# Patient Record
Sex: FEMALE | Race: WHITE | Employment: UNEMPLOYED | URBAN - METROPOLITAN AREA
[De-identification: names, ages, dates, MRNs, and addresses within clinical notes are randomized per-mention and may not be internally consistent; named-entity substitution may affect disease eponyms.]

---

## 2021-08-11 ENCOUNTER — APPOINTMENT (OUTPATIENT)
Dept: CT IMAGING | Age: 46
End: 2021-08-11
Payer: COMMERCIAL

## 2021-08-11 ENCOUNTER — HOSPITAL ENCOUNTER (EMERGENCY)
Age: 46
Discharge: LEFT BEFORE DISCHARGE | End: 2021-08-12
Attending: EMERGENCY MEDICINE
Payer: COMMERCIAL

## 2021-08-11 DIAGNOSIS — R79.89 ELEVATED LFTS: ICD-10-CM

## 2021-08-11 DIAGNOSIS — R20.0 NUMBNESS AND TINGLING: Primary | ICD-10-CM

## 2021-08-11 DIAGNOSIS — R20.2 NUMBNESS AND TINGLING: Primary | ICD-10-CM

## 2021-08-11 DIAGNOSIS — R53.1 LEFT-SIDED WEAKNESS: ICD-10-CM

## 2021-08-11 LAB
A/G RATIO: 1.4 (ref 1.1–2.2)
ALBUMIN SERPL-MCNC: 5 G/DL (ref 3.4–5)
ALP BLD-CCNC: 151 U/L (ref 40–129)
ALT SERPL-CCNC: 166 U/L (ref 10–40)
ANION GAP SERPL CALCULATED.3IONS-SCNC: 21 MMOL/L (ref 3–16)
AST SERPL-CCNC: 216 U/L (ref 15–37)
BASOPHILS ABSOLUTE: 0 K/UL (ref 0–0.2)
BASOPHILS RELATIVE PERCENT: 1.3 %
BILIRUB SERPL-MCNC: 0.4 MG/DL (ref 0–1)
BUN BLDV-MCNC: 8 MG/DL (ref 7–20)
CALCIUM SERPL-MCNC: 9.9 MG/DL (ref 8.3–10.6)
CHLORIDE BLD-SCNC: 99 MMOL/L (ref 99–110)
CO2: 24 MMOL/L (ref 21–32)
CREAT SERPL-MCNC: 0.7 MG/DL (ref 0.6–1.1)
D DIMER: 201 NG/ML DDU (ref 0–229)
EOSINOPHILS ABSOLUTE: 0.1 K/UL (ref 0–0.6)
EOSINOPHILS RELATIVE PERCENT: 1.8 %
GFR AFRICAN AMERICAN: >60
GFR NON-AFRICAN AMERICAN: >60
GLOBULIN: 3.6 G/DL
GLUCOSE BLD-MCNC: 138 MG/DL (ref 70–99)
HCT VFR BLD CALC: 39.4 % (ref 36–48)
HEMOGLOBIN: 13.7 G/DL (ref 12–16)
LYMPHOCYTES ABSOLUTE: 1.3 K/UL (ref 1–5.1)
LYMPHOCYTES RELATIVE PERCENT: 35.8 %
MCH RBC QN AUTO: 35.2 PG (ref 26–34)
MCHC RBC AUTO-ENTMCNC: 34.8 G/DL (ref 31–36)
MCV RBC AUTO: 101.1 FL (ref 80–100)
MONOCYTES ABSOLUTE: 0.5 K/UL (ref 0–1.3)
MONOCYTES RELATIVE PERCENT: 12.8 %
NEUTROPHILS ABSOLUTE: 1.8 K/UL (ref 1.7–7.7)
NEUTROPHILS RELATIVE PERCENT: 48.3 %
PDW BLD-RTO: 13.4 % (ref 12.4–15.4)
PLATELET # BLD: 85 K/UL (ref 135–450)
PLATELET SLIDE REVIEW: ABNORMAL
PMV BLD AUTO: 9.2 FL (ref 5–10.5)
POTASSIUM SERPL-SCNC: 3.6 MMOL/L (ref 3.5–5.1)
PRO-BNP: 25 PG/ML (ref 0–124)
RBC # BLD: 3.9 M/UL (ref 4–5.2)
SLIDE REVIEW: ABNORMAL
SODIUM BLD-SCNC: 144 MMOL/L (ref 136–145)
TOTAL PROTEIN: 8.6 G/DL (ref 6.4–8.2)
TROPONIN: <0.01 NG/ML
WBC # BLD: 3.7 K/UL (ref 4–11)

## 2021-08-11 PROCEDURE — 80053 COMPREHEN METABOLIC PANEL: CPT

## 2021-08-11 PROCEDURE — 83880 ASSAY OF NATRIURETIC PEPTIDE: CPT

## 2021-08-11 PROCEDURE — 70450 CT HEAD/BRAIN W/O DYE: CPT

## 2021-08-11 PROCEDURE — 2580000003 HC RX 258: Performed by: PHYSICIAN ASSISTANT

## 2021-08-11 PROCEDURE — 99284 EMERGENCY DEPT VISIT MOD MDM: CPT

## 2021-08-11 PROCEDURE — 93005 ELECTROCARDIOGRAM TRACING: CPT | Performed by: PHYSICIAN ASSISTANT

## 2021-08-11 PROCEDURE — 85025 COMPLETE CBC W/AUTO DIFF WBC: CPT

## 2021-08-11 PROCEDURE — 85379 FIBRIN DEGRADATION QUANT: CPT

## 2021-08-11 PROCEDURE — 84484 ASSAY OF TROPONIN QUANT: CPT

## 2021-08-11 RX ORDER — 0.9 % SODIUM CHLORIDE 0.9 %
1000 INTRAVENOUS SOLUTION INTRAVENOUS ONCE
Status: COMPLETED | OUTPATIENT
Start: 2021-08-11 | End: 2021-08-11

## 2021-08-11 RX ADMIN — SODIUM CHLORIDE 1000 ML: 9 INJECTION, SOLUTION INTRAVENOUS at 22:40

## 2021-08-11 ASSESSMENT — ENCOUNTER SYMPTOMS
COUGH: 0
SHORTNESS OF BREATH: 1
VOMITING: 0
DIARRHEA: 0
COLOR CHANGE: 0

## 2021-08-12 ENCOUNTER — APPOINTMENT (OUTPATIENT)
Dept: CT IMAGING | Age: 46
End: 2021-08-12
Payer: COMMERCIAL

## 2021-08-12 VITALS
OXYGEN SATURATION: 96 % | BODY MASS INDEX: 24.75 KG/M2 | WEIGHT: 134.5 LBS | TEMPERATURE: 98.3 F | HEART RATE: 85 BPM | RESPIRATION RATE: 16 BRPM | SYSTOLIC BLOOD PRESSURE: 132 MMHG | DIASTOLIC BLOOD PRESSURE: 87 MMHG | HEIGHT: 62 IN

## 2021-08-12 LAB
EKG ATRIAL RATE: 102 BPM
EKG DIAGNOSIS: NORMAL
EKG P AXIS: 49 DEGREES
EKG P-R INTERVAL: 132 MS
EKG Q-T INTERVAL: 358 MS
EKG QRS DURATION: 80 MS
EKG QTC CALCULATION (BAZETT): 466 MS
EKG R AXIS: 21 DEGREES
EKG T AXIS: 14 DEGREES
EKG VENTRICULAR RATE: 102 BPM

## 2021-08-12 PROCEDURE — 93010 ELECTROCARDIOGRAM REPORT: CPT | Performed by: INTERNAL MEDICINE

## 2021-08-12 PROCEDURE — 6360000004 HC RX CONTRAST MEDICATION: Performed by: PHYSICIAN ASSISTANT

## 2021-08-12 PROCEDURE — 70498 CT ANGIOGRAPHY NECK: CPT

## 2021-08-12 RX ADMIN — IOPAMIDOL 75 ML: 755 INJECTION, SOLUTION INTRAVENOUS at 00:40

## 2021-08-12 NOTE — ED PROVIDER NOTES
[facility]      Pt Name: Dontrell Bowman  MRN: [id number]  [place] 1975  Date of evaluation: 8/11/2021  Provider: LUZMARIA Granado    This patient was seen and evaluated by the attending physician Benoit Samaniego MD.    [address] [place] [address]       Chief Complaint   Patient presents with    Other     had first covid shot on monday not feeling well since        CRITICAL CARE TIME   I performed a total Critical Care time of 35 minutes, excluding separately reportable procedures. There was a high probability of clinically significant/life threatening deterioration in the patient's condition which required my urgent intervention. Not limited to multiple reexaminations, discussions with attending physician and consultants. HISTORY OF PRESENT ILLNESS  (Location/Symptom, Timing/Onset, Context/Setting, Quality, Duration, Modifying Factors, Severity.)   Dontrell Bowman is a 55 y.o. female who presents to the emergency department via EMS from home with complaint of shortness of breath. She states that on Monday she received her first of the [employer] vaccinations and on a couple of hours after this developed a numb tingling sensation on the outside of her left arm and leg. She states it feels weak and that she has pain when she tries to put weight on the left leg and arm. She states a couple of hours ago she started feeling short of breath. No productive cough or hemoptysis. She states that she has some nausea no vomiting. She denies known chronic medical problems although she states she is seeing a GI doctor for abdominal bloating in the coming weeks. She states that she drinks alcohol occasionally but not regularly or heavily. She has a bruise on her right eye that she states happened after she slipped on a new rug in her bathroom a couple of days ago. Nursing Notes were reviewed and I agree.     REVIEW OF SYSTEMS    (2-9 systems for level 4, 10 or more for level 5)     Review of Systems   Constitutional: Negative for fever. Respiratory: Positive for shortness of breath. Negative for cough. Cardiovascular: Positive for chest pain. Gastrointestinal: Negative for diarrhea and vomiting. Musculoskeletal: Positive for arthralgias. Skin: Negative for color change and rash. Neurological: Positive for weakness and numbness. Psychiatric/Behavioral: Negative for agitation, behavioral problems and confusion. Except as noted above the remainder of the review of systems was reviewed and negative. PAST MEDICAL HISTORY   No past medical history on file. SURGICAL HISTORY     No past surgical history on file. CURRENT MEDICATIONS       Previous Medications    No medications on file       ALLERGIES     Patient has no known allergies. FAMILY HISTORY     No family history on file. No family status information on file. SOCIAL HISTORY          PHYSICAL EXAM    (up to 7 for level 4, 8 or more for level 5)     ED Triage Vitals [08/11/21 2119]   BP Temp Temp src Pulse Resp SpO2 Height Weight   (!) 132/100 -- -- 114 16 96 % 5' 2\" (1.575 m) 134 lb 8 oz (61 kg)       Physical Exam  Vitals and nursing note reviewed. Constitutional:       Appearance: Normal appearance. HENT:      Head: Normocephalic and atraumatic. Mouth/Throat:      Mouth: Mucous membranes are moist.   Eyes:      Pupils: Pupils are equal, round, and reactive to light. Cardiovascular:      Rate and Rhythm: Tachycardia present. Pulses: Normal pulses. Pulmonary:      Effort: Pulmonary effort is normal. No respiratory distress. Abdominal:      Tenderness: There is no abdominal tenderness. There is no guarding or rebound. Musculoskeletal:         General: No swelling or deformity. Cervical back: Normal range of motion. Skin:     General: Skin is warm. Neurological:      General: No focal deficit present.       Mental Status: She is alert and oriented to person, place, and time. Cranial Nerves: No cranial nerve deficit. Sensory: Sensory deficit present. Motor: Weakness present. Gait: Gait normal.      Deep Tendon Reflexes: Reflexes normal.   Psychiatric:         Mood and Affect: Mood normal.         Behavior: Behavior normal.         DIAGNOSTIC RESULTS     EKG: All EKG's are interpreted by LUZMARIA Holliday in the absence of a cardiologist.    EKG interpreted by myself - please refer to attending physician's note for complete EKG interpretation:    Rhythm: sinus rhythm   Rate: tachycardia  No evidence of acute ischemia or injury. RADIOLOGY:   Non-plain film images such as CT, Ultrasound and MRI are read by the radiologist. Plain radiographic images are visualized and preliminarily interpreted by LUZMARIA Holliday with the below findings:    Reviewed radiologist's interpretation. Interpretation per the Radiologist below, if available at the time of this note:    CTA HEAD NECK W CONTRAST   Final Result   No large vessel occlusion or hemodynamic stenosis         CT HEAD WO CONTRAST   Final Result   No acute intracranial abnormality.                LABS:  Labs Reviewed   CBC WITH AUTO DIFFERENTIAL - Abnormal; Notable for the following components:       Result Value    WBC 3.7 (*)     RBC 3.90 (*)     .1 (*)     MCH 35.2 (*)     Platelets 85 (*)     All other components within normal limits    Narrative:     Performed at:  [facility]  [address] [facility] [address]   Phone [phone]   COMPREHENSIVE METABOLIC PANEL - Abnormal; Notable for the following components:    Anion Gap 21 (*)     Glucose 138 (*)     Total Protein 8.6 (*)     Alkaline Phosphatase 151 (*)      (*)      (*)     All other components within normal limits    Narrative:     Performed at:  [facility]  [address] [place], [place] [address]   Phone [phone]   COVID-19, RAPID   TROPONIN    Narrative:     Performed at:  [facility]  [address] [place] [address]   Phone [phone]   BRAIN NATRIURETIC PEPTIDE    Narrative:     Performed at:  [facility]  [address] [facility] [address]   Phone [phone]   D-DIMER, QUANTITATIVE    Narrative:     Performed at:  [facility]  [address] [place] [address]   Phone [phone]       All other labs were within normal range or not returned as of this dictation. EMERGENCY DEPARTMENT COURSE and DIFFERENTIAL DIAGNOSIS/MDM:   Vitals:    Vitals:    08/11/21 2119 08/11/21 2303   BP: (!) 132/100    Pulse: 114    Resp: 16    Temp:  98.3 °F (36.8 °C)   TempSrc:  Oral   SpO2: 96%    Weight: 134 lb 8 oz (61 kg)    Height: 5' 2\" (1.575 m)      Patient presents tachycardic febrile blood pressure initially elevated 132/100. She is not hypoxic she had episode of shortness of breath which prompted her to come to the ER however she said 3 days of numbness and weakness in her left arm and leg. Her pulses are intact at this time she seems to have good strength bilateral upper and lower extremities. Numb tingling sensation specifically in the lateral leg and arm has persisted for several days. I discussed several times with the patient and her significant other that our concern is for stroke. Her D-dimer is not elevated. CT scan is reassuring. The patient continues to desire to leave [place] and follow-up with her primary care doctor. She is alert and oriented x3. I discussed the findings of elevated liver enzymes and that this could be causing her bloating in her abdomen and pain and that she should have alcohol cessation. Return at anytime if she changes her mind or for new, worsening or other concerns.     CONSULTS:  None    PROCEDURES:  Procedures      FINAL IMPRESSION      1. Numbness and tingling    2. Left-sided weakness    3.  Elevated LFTs          DISPOSITION/PLAN   DISPOSITION [place] 08/12/2021 01:28:52 AM      PATIENT REFERRED TO:  Bi Zaman  [address]  [phone]    Call in 1 day  For follow up      [address] Kellee Gonzalez:  New Prescriptions    No medications on file       (Please note that portions of this note were completed with a voice recognition program.  Efforts were made to edit the dictations but occasionally words are mis-transcribed.)    Vaishali Griffith, [address] [place], [place]  08/12/21 9352

## 2021-08-12 NOTE — ED PROVIDER NOTES
EKG Interpretation    Interpreted by emergency department physician  Time performed: 5960  Time read: 2227    Rhythm: Sinus tachycardia  Ventricular Rate: 102  QRS Axis: 21  Ectopy: None  Conduction: Sinus tachycardia  ST Segments: normal  T Waves: normal  Q Waves: None    Other findings: Motion artifact but EKG is readable    Compared to EKG on: None to compare    Clinical Impression: Normal sinus rhythm, normal EKG, there is motion artifact but EKG is readable. There is no old EKG to compare.     Tran 149, [address], [place]  08/11/21 5752

## 2021-08-12 NOTE — ED PROVIDER NOTES
I independently evaluated and obtained a history and physical on [facility]. All diagnostic, treatment, and disposition assistants were made to myself in conjunction the advanced practice provider. For further details of this patient's emergency department encounter, please see the advanced practice provider's documentation. History: 59-year-old female presents complaining of left arm and leg numbness present for the past 2 days. Patient states the symptoms started after she received her first COVID-19 vaccine on Monday. Patient denies any actual weakness of her arm or leg but states that the numbness has persisted throughout the past 2 days. Patient also states she been feeling very fatigued. Denies any cough. States she has had some shortness of breath. No fevers. No nausea or vomiting. Normal urinary and bowel habits. Physician Exam: Alert and oriented x4, EOMI, PERRLA, normocephalic and atraumatic, moist mucous membranes, tachycardic, regular rhythm, lungs clear auscultation bilaterally, speaking in full sentences, abdomen soft nontender nondistended, 5/5 strength, decreased light touch sensation left upper and lower extremity, normal light touch sensation right upper and lower extremity, GCS 15, cranial nerves II through XII intact, normal gait, no dysarthria, no aphasia, no facial droop, skin warm and dry    NIH Stroke Scale  NIH Stroke Scale Assessed: Yes  Interval: Baseline  Level of Consciousness (1a. ): Alert  LOC Questions (1b. ):  Answers both correctly  LOC Commands (1c. ): Performs both tasks correctly  Best Gaze (2. ): Normal  Visual (3. ): No visual loss  Facial Palsy (4. ): Normal symmetrical movement  Motor Arm, Left (5a. ): No drift  Motor Arm, Right (5b. ): No drift  Motor Leg, Left (6a. ): No drift  Motor Leg, Right (6b. ): No drift  Limb Ataxia (7. ): Absent  Sensory (8. ): (!) Mild to Moderate  Best Language (9. ): No aphasia  Dysarthria (10. ): Normal  Extinction and Inattention (11): No abnormality  Total: 1      The Ekg interpreted by me shows  Sinus tachycardia with a rate of 102, normal axis, , QRS 80, QTc 466, no ST elevations, nonspecific ST changes, no prior EKG on file. Patient seen and evaluated. History and physical as above. Nontoxic and afebrile. Patient presents with left arm and leg paresthesias x2 days. Patient outside the treatment for any acute stroke. Possible slight left upper extremity weakness compared to the right although weakness is not profound or debilitating. CT head unremarkable. Plan for admission for further neurologic evaluation and MRI.     Jayda Gonzalez MD   [employer]  8/11/21  10:05 PM EDT            Jayda Gonzalez MD  08/11/21 5471

## 2021-08-12 NOTE — ED NOTES
Bed: C28  Expected date:   Expected time:   Means of arrival: [facility] EMS  Comments:  46 SOB s/p covid shot 2 days ago     Kisha Domínguez RN  08/11/21 2104

## 2021-08-13 ENCOUNTER — CARE COORDINATION (OUTPATIENT)
Dept: CARE COORDINATION | Age: 46
End: 2021-08-13

## 2021-08-13 NOTE — CARE COORDINATION
Educated patient about risk for severe COVID-19 due to risk factors according to CDC guidelines. ACM reviewed discharge instructions, medical action plan and red flag symptoms with the patient who verbalized understanding. Discussed COVID vaccination status: Yes. Education provided on COVID-19 vaccination as appropriate. Discussed exposure protocols and quarantine with CDC Guidelines. Patient was given an opportunity to verbalize any questions and concerns and agrees to contact ACM or health care provider for questions related to their healthcare. Pt returned call to [facility] today. She reports she has been very \"lethargic\" since she received the first vaccine dose this past Monday. She stated she was sleeping when ACM called earlier today and has been sleeping a lot since Monday. She stated she has not been eating much, but she is trying to stay well hydrated by drinking water. Pt reports she did not receive the COVID-19 test while at the hospital and ACM informed her I see the test had been cancelled, but I'm not sure why. She stated she is also breathing easier today, but did not have an overall good experience in the ED. ACM listened as pt described her visit, and [facility] provided pt with the contact information to reach a patient representative. [facility] asked if pt had called to f/u with her PCP yet and she stated the PCP listed does not accept her insurance. [facility] provided pt with the find a doc information and also described how she can call the customer service number on her insurance card and ask for a list of providers accepting new patients. Pt verbalized understanding. ACM reviewed s/s of when to return to the ED including: any chest pain/pressure, any increase in difficulty breathing or SOB, or any fainting episodes. Pt verbalized understanding.      ACM and patient discussed some of her lab values, and ACM informed pt this is another reason to f/u with a PCP so that they can discuss, in detail, her lab results. Pt stated she would do so. ACM asked if patient has questions/concerns and she stated she does not. ACM encouraged her to call ACM if any arise. She agreed with this plan.

## 2021-08-13 NOTE — CARE COORDINATION
Outreach attempt regarding pt recent visit to the ED. Pt was unable to reach today; ACM left VM message with contact information. ACM to make another attempt at a later date/time.

## 2021-08-27 ENCOUNTER — CARE COORDINATION (OUTPATIENT)
Dept: CARE COORDINATION | Age: 46
End: 2021-08-27

## 2022-06-05 ENCOUNTER — HOSPITAL ENCOUNTER (INPATIENT)
Age: 47
LOS: 2 days | Discharge: LEFT BEFORE DISCHARGE | DRG: 053 | End: 2022-06-07
Attending: EMERGENCY MEDICINE | Admitting: INTERNAL MEDICINE
Payer: COMMERCIAL

## 2022-06-05 ENCOUNTER — APPOINTMENT (OUTPATIENT)
Dept: CT IMAGING | Age: 47
DRG: 053 | End: 2022-06-05
Payer: COMMERCIAL

## 2022-06-05 DIAGNOSIS — D69.6 THROMBOCYTOPENIA (HCC): ICD-10-CM

## 2022-06-05 DIAGNOSIS — R56.9 NEW ONSET SEIZURE (HCC): Primary | ICD-10-CM

## 2022-06-05 DIAGNOSIS — F10.11 HISTORY OF ALCOHOL ABUSE: ICD-10-CM

## 2022-06-05 DIAGNOSIS — R79.89 ELEVATED LFTS: ICD-10-CM

## 2022-06-05 DIAGNOSIS — N30.00 ACUTE CYSTITIS WITHOUT HEMATURIA: ICD-10-CM

## 2022-06-05 DIAGNOSIS — E87.20 METABOLIC ACIDOSIS: ICD-10-CM

## 2022-06-05 LAB
ACETAMINOPHEN LEVEL: <5 UG/ML (ref 10–30)
ALBUMIN SERPL-MCNC: 4.8 G/DL (ref 3.4–5)
ALP BLD-CCNC: 140 U/L (ref 40–129)
ALT SERPL-CCNC: 123 U/L (ref 10–40)
AMMONIA: 28 UMOL/L (ref 11–51)
AMPHETAMINE SCREEN, URINE: NORMAL
ANION GAP SERPL CALCULATED.3IONS-SCNC: 25 MMOL/L (ref 3–16)
ANION GAP SERPL CALCULATED.3IONS-SCNC: 30 MMOL/L (ref 3–16)
AST SERPL-CCNC: 152 U/L (ref 15–37)
BACTERIA: ABNORMAL /HPF
BARBITURATE SCREEN URINE: NORMAL
BASOPHILS ABSOLUTE: 0 K/UL (ref 0–0.2)
BASOPHILS RELATIVE PERCENT: 0.3 %
BENZODIAZEPINE SCREEN, URINE: NORMAL
BILIRUB SERPL-MCNC: 2 MG/DL (ref 0–1)
BILIRUBIN DIRECT: 0.7 MG/DL (ref 0–0.3)
BILIRUBIN URINE: NEGATIVE
BILIRUBIN, INDIRECT: 1.3 MG/DL (ref 0–1)
BLOOD, URINE: ABNORMAL
BUN BLDV-MCNC: 5 MG/DL (ref 7–20)
BUN BLDV-MCNC: 9 MG/DL (ref 7–20)
CALCIUM SERPL-MCNC: 7.6 MG/DL (ref 8.3–10.6)
CALCIUM SERPL-MCNC: 9.4 MG/DL (ref 8.3–10.6)
CANNABINOID SCREEN URINE: NORMAL
CHLORIDE BLD-SCNC: 89 MMOL/L (ref 99–110)
CHLORIDE BLD-SCNC: 96 MMOL/L (ref 99–110)
CLARITY: ABNORMAL
CO2: 14 MMOL/L (ref 21–32)
CO2: 14 MMOL/L (ref 21–32)
COCAINE METABOLITE SCREEN URINE: NORMAL
COLOR: YELLOW
COMMENT UA: ABNORMAL
CREAT SERPL-MCNC: 0.6 MG/DL (ref 0.6–1.1)
CREAT SERPL-MCNC: <0.5 MG/DL (ref 0.6–1.1)
EOSINOPHILS ABSOLUTE: 0 K/UL (ref 0–0.6)
EOSINOPHILS RELATIVE PERCENT: 0.1 %
EPITHELIAL CELLS, UA: 15 /HPF (ref 0–5)
ETHANOL: NORMAL MG/DL (ref 0–0.08)
GFR AFRICAN AMERICAN: >60
GFR AFRICAN AMERICAN: >60
GFR NON-AFRICAN AMERICAN: >60
GFR NON-AFRICAN AMERICAN: >60
GLUCOSE BLD-MCNC: 107 MG/DL (ref 70–99)
GLUCOSE BLD-MCNC: 190 MG/DL (ref 70–99)
GLUCOSE BLD-MCNC: 193 MG/DL (ref 70–99)
GLUCOSE URINE: NEGATIVE MG/DL
HAV IGM SER IA-ACNC: NORMAL
HCT VFR BLD CALC: 38.6 % (ref 36–48)
HEMOGLOBIN: 13.3 G/DL (ref 12–16)
HEPATITIS B CORE IGM ANTIBODY: NORMAL
HEPATITIS B SURFACE ANTIGEN INTERPRETATION: NORMAL
HEPATITIS C ANTIBODY INTERPRETATION: NORMAL
HYALINE CASTS: 0 /LPF (ref 0–8)
INR BLD: 1.19 (ref 0.87–1.14)
KETONES, URINE: >=160 MG/DL
LACTIC ACID: 1 MMOL/L (ref 0.4–2)
LACTIC ACID: 3.7 MMOL/L (ref 0.4–2)
LEUKOCYTE ESTERASE, URINE: ABNORMAL
LIPASE: 54 U/L (ref 13–60)
LYMPHOCYTES ABSOLUTE: 0.4 K/UL (ref 1–5.1)
LYMPHOCYTES RELATIVE PERCENT: 10.9 %
Lab: NORMAL
MAGNESIUM: 1.5 MG/DL (ref 1.8–2.4)
MCH RBC QN AUTO: 36.6 PG (ref 26–34)
MCHC RBC AUTO-ENTMCNC: 34.5 G/DL (ref 31–36)
MCV RBC AUTO: 106 FL (ref 80–100)
METHADONE SCREEN, URINE: NORMAL
MICROSCOPIC EXAMINATION: YES
MONOCYTES ABSOLUTE: 0.3 K/UL (ref 0–1.3)
MONOCYTES RELATIVE PERCENT: 8.5 %
NEUTROPHILS ABSOLUTE: 3.2 K/UL (ref 1.7–7.7)
NEUTROPHILS RELATIVE PERCENT: 80.2 %
NITRITE, URINE: NEGATIVE
OPIATE SCREEN URINE: NORMAL
OXYCODONE URINE: NORMAL
PDW BLD-RTO: 14.1 % (ref 12.4–15.4)
PERFORMED ON: ABNORMAL
PH UA: 5.5 (ref 5–8)
PH UA: 6
PHENCYCLIDINE SCREEN URINE: NORMAL
PLATELET # BLD: 60 K/UL (ref 135–450)
PMV BLD AUTO: 8.4 FL (ref 5–10.5)
POTASSIUM REFLEX MAGNESIUM: 3.6 MMOL/L (ref 3.5–5.1)
POTASSIUM REFLEX MAGNESIUM: 3.7 MMOL/L (ref 3.5–5.1)
PROPOXYPHENE SCREEN: NORMAL
PROTEIN UA: 300 MG/DL
PROTHROMBIN TIME: 15 SEC (ref 11.7–14.5)
RAPID INFLUENZA  B AGN: NEGATIVE
RAPID INFLUENZA A AGN: NEGATIVE
RBC # BLD: 3.64 M/UL (ref 4–5.2)
RBC UA: ABNORMAL /HPF (ref 0–4)
SALICYLATE, SERUM: <0.3 MG/DL (ref 15–30)
SARS-COV-2, NAAT: NOT DETECTED
SODIUM BLD-SCNC: 133 MMOL/L (ref 136–145)
SODIUM BLD-SCNC: 135 MMOL/L (ref 136–145)
SPECIFIC GRAVITY UA: 1.02 (ref 1–1.03)
TOTAL CK: 379 U/L (ref 26–192)
TOTAL PROTEIN: 8.3 G/DL (ref 6.4–8.2)
TROPONIN: <0.01 NG/ML
URINE REFLEX TO CULTURE: YES
URINE TYPE: ABNORMAL
UROBILINOGEN, URINE: 0.2 E.U./DL
WBC # BLD: 4 K/UL (ref 4–11)
WBC UA: 79 /HPF (ref 0–5)

## 2022-06-05 PROCEDURE — 83735 ASSAY OF MAGNESIUM: CPT

## 2022-06-05 PROCEDURE — 80143 DRUG ASSAY ACETAMINOPHEN: CPT

## 2022-06-05 PROCEDURE — 83605 ASSAY OF LACTIC ACID: CPT

## 2022-06-05 PROCEDURE — 36415 COLL VENOUS BLD VENIPUNCTURE: CPT

## 2022-06-05 PROCEDURE — 96365 THER/PROPH/DIAG IV INF INIT: CPT

## 2022-06-05 PROCEDURE — 85025 COMPLETE CBC W/AUTO DIFF WBC: CPT

## 2022-06-05 PROCEDURE — 87086 URINE CULTURE/COLONY COUNT: CPT

## 2022-06-05 PROCEDURE — 81001 URINALYSIS AUTO W/SCOPE: CPT

## 2022-06-05 PROCEDURE — 80307 DRUG TEST PRSMV CHEM ANLYZR: CPT

## 2022-06-05 PROCEDURE — 87804 INFLUENZA ASSAY W/OPTIC: CPT

## 2022-06-05 PROCEDURE — 6370000000 HC RX 637 (ALT 250 FOR IP): Performed by: INTERNAL MEDICINE

## 2022-06-05 PROCEDURE — 99285 EMERGENCY DEPT VISIT HI MDM: CPT

## 2022-06-05 PROCEDURE — 87040 BLOOD CULTURE FOR BACTERIA: CPT

## 2022-06-05 PROCEDURE — 70450 CT HEAD/BRAIN W/O DYE: CPT

## 2022-06-05 PROCEDURE — 85610 PROTHROMBIN TIME: CPT

## 2022-06-05 PROCEDURE — 6360000002 HC RX W HCPCS: Performed by: NURSE PRACTITIONER

## 2022-06-05 PROCEDURE — 6360000002 HC RX W HCPCS: Performed by: INTERNAL MEDICINE

## 2022-06-05 PROCEDURE — 6370000000 HC RX 637 (ALT 250 FOR IP): Performed by: NURSE PRACTITIONER

## 2022-06-05 PROCEDURE — 96366 THER/PROPH/DIAG IV INF ADDON: CPT

## 2022-06-05 PROCEDURE — 2580000003 HC RX 258: Performed by: NURSE PRACTITIONER

## 2022-06-05 PROCEDURE — 6360000004 HC RX CONTRAST MEDICATION: Performed by: NURSE PRACTITIONER

## 2022-06-05 PROCEDURE — 80074 ACUTE HEPATITIS PANEL: CPT

## 2022-06-05 PROCEDURE — 2500000003 HC RX 250 WO HCPCS: Performed by: NURSE PRACTITIONER

## 2022-06-05 PROCEDURE — 96375 TX/PRO/DX INJ NEW DRUG ADDON: CPT

## 2022-06-05 PROCEDURE — 96361 HYDRATE IV INFUSION ADD-ON: CPT

## 2022-06-05 PROCEDURE — 96367 TX/PROPH/DG ADDL SEQ IV INF: CPT

## 2022-06-05 PROCEDURE — 1200000000 HC SEMI PRIVATE

## 2022-06-05 PROCEDURE — 84484 ASSAY OF TROPONIN QUANT: CPT

## 2022-06-05 PROCEDURE — 71260 CT THORAX DX C+: CPT

## 2022-06-05 PROCEDURE — 82550 ASSAY OF CK (CPK): CPT

## 2022-06-05 PROCEDURE — 87635 SARS-COV-2 COVID-19 AMP PRB: CPT

## 2022-06-05 PROCEDURE — 83690 ASSAY OF LIPASE: CPT

## 2022-06-05 PROCEDURE — 82077 ASSAY SPEC XCP UR&BREATH IA: CPT

## 2022-06-05 PROCEDURE — 80048 BASIC METABOLIC PNL TOTAL CA: CPT

## 2022-06-05 PROCEDURE — 82140 ASSAY OF AMMONIA: CPT

## 2022-06-05 PROCEDURE — 2580000003 HC RX 258: Performed by: INTERNAL MEDICINE

## 2022-06-05 PROCEDURE — 80076 HEPATIC FUNCTION PANEL: CPT

## 2022-06-05 PROCEDURE — 80179 DRUG ASSAY SALICYLATE: CPT

## 2022-06-05 PROCEDURE — 93005 ELECTROCARDIOGRAM TRACING: CPT | Performed by: NURSE PRACTITIONER

## 2022-06-05 PROCEDURE — 74177 CT ABD & PELVIS W/CONTRAST: CPT

## 2022-06-05 RX ORDER — LORAZEPAM 2 MG/ML
3 INJECTION INTRAMUSCULAR
Status: DISCONTINUED | OUTPATIENT
Start: 2022-06-05 | End: 2022-06-07 | Stop reason: HOSPADM

## 2022-06-05 RX ORDER — POLYETHYLENE GLYCOL 3350 17 G/17G
17 POWDER, FOR SOLUTION ORAL DAILY PRN
Status: DISCONTINUED | OUTPATIENT
Start: 2022-06-05 | End: 2022-06-07 | Stop reason: HOSPADM

## 2022-06-05 RX ORDER — LORAZEPAM 1 MG/1
3 TABLET ORAL
Status: DISCONTINUED | OUTPATIENT
Start: 2022-06-05 | End: 2022-06-07 | Stop reason: HOSPADM

## 2022-06-05 RX ORDER — SODIUM CHLORIDE 0.9 % (FLUSH) 0.9 %
5-40 SYRINGE (ML) INJECTION PRN
Status: DISCONTINUED | OUTPATIENT
Start: 2022-06-05 | End: 2022-06-07 | Stop reason: HOSPADM

## 2022-06-05 RX ORDER — ACETAMINOPHEN 325 MG/1
650 TABLET ORAL EVERY 6 HOURS PRN
Status: DISCONTINUED | OUTPATIENT
Start: 2022-06-05 | End: 2022-06-07 | Stop reason: HOSPADM

## 2022-06-05 RX ORDER — SODIUM CHLORIDE 0.9 % (FLUSH) 0.9 %
5-40 SYRINGE (ML) INJECTION PRN
Status: DISCONTINUED | OUTPATIENT
Start: 2022-06-05 | End: 2022-06-05 | Stop reason: SDUPTHER

## 2022-06-05 RX ORDER — SODIUM CHLORIDE 0.9 % (FLUSH) 0.9 %
5-40 SYRINGE (ML) INJECTION EVERY 12 HOURS SCHEDULED
Status: DISCONTINUED | OUTPATIENT
Start: 2022-06-05 | End: 2022-06-07 | Stop reason: HOSPADM

## 2022-06-05 RX ORDER — MAGNESIUM SULFATE IN WATER 40 MG/ML
4000 INJECTION, SOLUTION INTRAVENOUS ONCE
Status: COMPLETED | OUTPATIENT
Start: 2022-06-05 | End: 2022-06-05

## 2022-06-05 RX ORDER — ONDANSETRON 2 MG/ML
4 INJECTION INTRAMUSCULAR; INTRAVENOUS EVERY 6 HOURS PRN
Status: DISCONTINUED | OUTPATIENT
Start: 2022-06-05 | End: 2022-06-07 | Stop reason: HOSPADM

## 2022-06-05 RX ORDER — 0.9 % SODIUM CHLORIDE 0.9 %
1000 INTRAVENOUS SOLUTION INTRAVENOUS ONCE
Status: COMPLETED | OUTPATIENT
Start: 2022-06-05 | End: 2022-06-05

## 2022-06-05 RX ORDER — LORAZEPAM 2 MG/ML
1 INJECTION INTRAMUSCULAR
Status: DISCONTINUED | OUTPATIENT
Start: 2022-06-05 | End: 2022-06-07 | Stop reason: HOSPADM

## 2022-06-05 RX ORDER — ACETAMINOPHEN 500 MG
1000 TABLET ORAL ONCE
Status: COMPLETED | OUTPATIENT
Start: 2022-06-05 | End: 2022-06-05

## 2022-06-05 RX ORDER — LORAZEPAM 0.5 MG/1
0.5 TABLET ORAL ONCE
Status: COMPLETED | OUTPATIENT
Start: 2022-06-05 | End: 2022-06-05

## 2022-06-05 RX ORDER — SODIUM CHLORIDE 0.9 % (FLUSH) 0.9 %
5-40 SYRINGE (ML) INJECTION EVERY 12 HOURS SCHEDULED
Status: DISCONTINUED | OUTPATIENT
Start: 2022-06-05 | End: 2022-06-05 | Stop reason: SDUPTHER

## 2022-06-05 RX ORDER — ACETAMINOPHEN/DIPHENHYDRAMINE 500MG-25MG
1 TABLET ORAL NIGHTLY PRN
COMMUNITY

## 2022-06-05 RX ORDER — LORAZEPAM 1 MG/1
2 TABLET ORAL
Status: DISCONTINUED | OUTPATIENT
Start: 2022-06-05 | End: 2022-06-07 | Stop reason: HOSPADM

## 2022-06-05 RX ORDER — LORAZEPAM 1 MG/1
1 TABLET ORAL
Status: DISCONTINUED | OUTPATIENT
Start: 2022-06-05 | End: 2022-06-07 | Stop reason: HOSPADM

## 2022-06-05 RX ORDER — M-VIT,TX,IRON,MINS/CALC/FOLIC 27MG-0.4MG
1 TABLET ORAL DAILY
COMMUNITY

## 2022-06-05 RX ORDER — LORAZEPAM 2 MG/ML
2 INJECTION INTRAMUSCULAR
Status: DISCONTINUED | OUTPATIENT
Start: 2022-06-05 | End: 2022-06-07 | Stop reason: HOSPADM

## 2022-06-05 RX ORDER — LORAZEPAM 1 MG/1
4 TABLET ORAL
Status: DISCONTINUED | OUTPATIENT
Start: 2022-06-05 | End: 2022-06-07 | Stop reason: HOSPADM

## 2022-06-05 RX ORDER — ACETAMINOPHEN 650 MG/1
650 SUPPOSITORY RECTAL EVERY 6 HOURS PRN
Status: DISCONTINUED | OUTPATIENT
Start: 2022-06-05 | End: 2022-06-07 | Stop reason: HOSPADM

## 2022-06-05 RX ORDER — SODIUM CHLORIDE 9 MG/ML
INJECTION, SOLUTION INTRAVENOUS PRN
Status: DISCONTINUED | OUTPATIENT
Start: 2022-06-05 | End: 2022-06-05 | Stop reason: SDUPTHER

## 2022-06-05 RX ORDER — LEVETIRACETAM 500 MG/1
500 TABLET ORAL 2 TIMES DAILY
Status: DISCONTINUED | OUTPATIENT
Start: 2022-06-05 | End: 2022-06-07 | Stop reason: HOSPADM

## 2022-06-05 RX ORDER — MULTIVITAMIN WITH IRON
1 TABLET ORAL DAILY
Status: DISCONTINUED | OUTPATIENT
Start: 2022-06-05 | End: 2022-06-07 | Stop reason: HOSPADM

## 2022-06-05 RX ORDER — SODIUM CHLORIDE 9 MG/ML
INJECTION, SOLUTION INTRAVENOUS PRN
Status: DISCONTINUED | OUTPATIENT
Start: 2022-06-05 | End: 2022-06-07 | Stop reason: HOSPADM

## 2022-06-05 RX ORDER — DROPERIDOL 2.5 MG/ML
0.62 INJECTION, SOLUTION INTRAMUSCULAR; INTRAVENOUS EVERY 6 HOURS PRN
Status: DISCONTINUED | OUTPATIENT
Start: 2022-06-05 | End: 2022-06-07 | Stop reason: HOSPADM

## 2022-06-05 RX ORDER — LORAZEPAM 2 MG/ML
4 INJECTION INTRAMUSCULAR
Status: DISCONTINUED | OUTPATIENT
Start: 2022-06-05 | End: 2022-06-07 | Stop reason: HOSPADM

## 2022-06-05 RX ORDER — ONDANSETRON 4 MG/1
4 TABLET, ORALLY DISINTEGRATING ORAL EVERY 8 HOURS PRN
Status: DISCONTINUED | OUTPATIENT
Start: 2022-06-05 | End: 2022-06-07 | Stop reason: HOSPADM

## 2022-06-05 RX ADMIN — LORAZEPAM 2 MG: 2 INJECTION INTRAMUSCULAR; INTRAVENOUS at 21:42

## 2022-06-05 RX ADMIN — LORAZEPAM 2 MG: 2 INJECTION INTRAMUSCULAR; INTRAVENOUS at 16:12

## 2022-06-05 RX ADMIN — ACETAMINOPHEN 650 MG: 325 TABLET ORAL at 16:11

## 2022-06-05 RX ADMIN — IOPAMIDOL 75 ML: 755 INJECTION, SOLUTION INTRAVENOUS at 11:01

## 2022-06-05 RX ADMIN — CEFTRIAXONE 1000 MG: 1 INJECTION, POWDER, FOR SOLUTION INTRAMUSCULAR; INTRAVENOUS at 11:40

## 2022-06-05 RX ADMIN — MAGNESIUM SULFATE HEPTAHYDRATE 4000 MG: 40 INJECTION, SOLUTION INTRAVENOUS at 15:33

## 2022-06-05 RX ADMIN — LORAZEPAM 0.5 MG: 0.5 TABLET ORAL at 10:06

## 2022-06-05 RX ADMIN — LEVETIRACETAM 1500 MG: 100 INJECTION, SOLUTION INTRAVENOUS at 10:05

## 2022-06-05 RX ADMIN — SODIUM CHLORIDE 1000 ML: 9 INJECTION, SOLUTION INTRAVENOUS at 10:04

## 2022-06-05 RX ADMIN — ACETAMINOPHEN 1000 MG: 500 TABLET ORAL at 10:06

## 2022-06-05 RX ADMIN — DROPERIDOL 0.62 MG: 2.5 INJECTION, SOLUTION INTRAMUSCULAR; INTRAVENOUS at 10:02

## 2022-06-05 RX ADMIN — FOLIC ACID: 5 INJECTION, SOLUTION INTRAMUSCULAR; INTRAVENOUS; SUBCUTANEOUS at 11:37

## 2022-06-05 RX ADMIN — LEVETIRACETAM 500 MG: 500 TABLET, FILM COATED ORAL at 15:31

## 2022-06-05 RX ADMIN — LEVETIRACETAM 500 MG: 500 TABLET, FILM COATED ORAL at 21:42

## 2022-06-05 RX ADMIN — SODIUM CHLORIDE, PRESERVATIVE FREE 10 ML: 5 INJECTION INTRAVENOUS at 21:42

## 2022-06-05 RX ADMIN — SODIUM CHLORIDE 1000 ML: 9 INJECTION, SOLUTION INTRAVENOUS at 11:43

## 2022-06-05 RX ADMIN — THERA TABS 1 TABLET: TAB at 16:11

## 2022-06-05 ASSESSMENT — PAIN SCALES - GENERAL
PAINLEVEL_OUTOF10: 3
PAINLEVEL_OUTOF10: 7
PAINLEVEL_OUTOF10: 3
PAINLEVEL_OUTOF10: 6
PAINLEVEL_OUTOF10: 3
PAINLEVEL_OUTOF10: 0

## 2022-06-05 ASSESSMENT — PAIN DESCRIPTION - ONSET: ONSET: ON-GOING

## 2022-06-05 ASSESSMENT — PAIN DESCRIPTION - LOCATION: LOCATION: HEAD

## 2022-06-05 ASSESSMENT — PAIN DESCRIPTION - ORIENTATION: ORIENTATION: RIGHT;LEFT

## 2022-06-05 ASSESSMENT — PAIN DESCRIPTION - DESCRIPTORS: DESCRIPTORS: ACHING

## 2022-06-05 ASSESSMENT — PAIN - FUNCTIONAL ASSESSMENT: PAIN_FUNCTIONAL_ASSESSMENT: ACTIVITIES ARE NOT PREVENTED

## 2022-06-05 ASSESSMENT — PAIN DESCRIPTION - FREQUENCY: FREQUENCY: CONTINUOUS

## 2022-06-05 ASSESSMENT — PAIN DESCRIPTION - PAIN TYPE: TYPE: ACUTE PAIN

## 2022-06-05 NOTE — ED NOTES
Pt resting in bed comfortably with no s/s or c/o pain or distress noted or reported. No further seizure activity noted or reported. Seizure pads/precautions remain in place. Significant other remains at bedside. Call light in reach, will continue to monitor.       Makenna Castillo RN  06/05/22 7093

## 2022-06-05 NOTE — PROGRESS NOTES
Patient is now in room 4121, VSS on room air, patient is lethargic but arousal to voice and touch. When awake patient A+Ox3, disorientated to place.  at bedside, heart monitor in place, seizure and fall precautions are also in place. Orientation to the room provided and all questions answered at this time. Will continue to monitor.      Electronically signed by Abdelrahman Rushing RN on 6/5/2022 at 6:01 PM

## 2022-06-05 NOTE — ED NOTES
Pt to ED via [facility] d/t seizures. Per EMS report, pt had 2 seizures this morning, witnessed by her , both lasting approx 30-40 seconds. First seizure was around 0500 this morning and the second one was around 0830. Pt has had 1 seizure in the past, states she was not prescribed seizure meds. Pt did bite her lip, dried blood noted to bottom lip. Pt is alert and oriented x4 upon ED arrival, placed on cardiac monitor and IV being placed. Seizure pads/precautions put in place.   [facility] at bedside upon pt arrival.      Debbi Luna RN  06/05/22 9097

## 2022-06-05 NOTE — ED PROVIDER NOTES
[facility]      Pt Name: Evan Mireles  MRN: [id number]  [place] 1975  Date of evaluation: 6/5/2022  Provider: Rosana Jaquez, [address] [place] [address]  Chief Complaint   Patient presents with    Seizures     Pt to ED via [place] [address] EMS d/t seizures. Per EMS report, pt had 2 seizures this morning, witnessed by her , both lasting approx 30-40 seconds. Pt has had 1 seizure in the past, was not rx seizure meds. I have fully participated in the care of Evan Mireles and have had a face-to-face evaluation. I have reviewed and agree with all pertinent clinical information, and midlevel provider's history, and physical exam. I have also reviewed the labs, EKG, and imaging studies and treatment plan. I have also reviewed and agree with the medications, allergies and past medical history section for this Evan Mireles. I agree with the diagnosis, and I concur. I wore personal protective equipment when I was in the room the entire time. This includes gloves, N95 mask, face shield, and a glove over my stethoscope for protection. Past Medical History:   Diagnosis Date    Seizures ([facility]        MDM:  Evan Mireles is a 55 y.o. female who presents with seizure activity per . She had 2 seizures this morning. She has had 1 seizure in the past that was related to alcohol withdrawal.  She was seen at [facility] at that time. She has not drank alcohol for a long time according to her . Patient is confused and postictal.  When I evaluated her she was tired but alert and oriented x3. Physical exam is nonfocal.  Heart is regular rate and rhythm without murmurs clicks or rubs. She is mildly tachycardic. Temperature is 99.7 and blood pressure soft at 107/75. She was given IV fluids in the emergency department. She has a metabolic acidosis with a CO2 of 14.   Lactic acid is elevated, liver functions are elevated. Platelets are 01,811. MCV is elevated. These are all consistent with chronic alcoholism. It may be related to either medical problems. Therefore, patient was admitted to the hospital for further evaluation and treatment. Her temperature elevated to 100.5 during her stay with a heart rate of 101. Therefore, she is considered SIRS activity with a source of UTI. Hospitalist was notified by the nurse practitioner. Vitals:    06/05/22 1237   BP: 107/75   Pulse: (!) 102   Resp: 18   Temp:    SpO2: 97%       Lab results  Labs Reviewed   CK - Abnormal; Notable for the following components:       Result Value    Total  (*)     All other components within normal limits    Narrative:     Annika Hung tel. [phone],  Chemistry results called to and read back by Debbi Luna Rn, 06/05/2022 10:51,  by Jose David   LACTIC ACID - Abnormal; Notable for the following components:    Lactic Acid 3.7 (*)     All other components within normal limits   CBC WITH AUTO DIFFERENTIAL - Abnormal; Notable for the following components:    RBC 3.64 (*)     .0 (*)     MCH 36.6 (*)     Platelets 60 (*)     Lymphocytes Absolute 0.4 (*)     All other components within normal limits   BASIC METABOLIC PANEL W/ REFLEX TO MG FOR LOW K - Abnormal; Notable for the following components:    Sodium 133 (*)     Chloride 89 (*)     CO2 14 (*)     Anion Gap 30 (*)     Glucose 190 (*)     All other components within normal limits    Narrative:     Annika Hung tel. [phone],  Chemistry results called to and read back by Debbi Luna Rn, 06/05/2022 10:51,  by Jose David   HEPATIC FUNCTION PANEL - Abnormal; Notable for the following components: Total Protein 8.3 (*)     Alkaline Phosphatase 140 (*)      (*)      (*)     Total Bilirubin 2.0 (*)     Bilirubin, Direct 0.7 (*)     Bilirubin, Indirect 1.3 (*)     All other components within normal limits    Narrative:     CALL  [place]  SKERK tel. [phone],  Chemistry results called to and read back by Johanna Ramirez Rn, 06/05/2022 10:51,  by Fracisco Perales TO CULTURE - Abnormal; Notable for the following components:    Clarity, UA CLOUDY (*)     Ketones, Urine >=160 (*)     Blood, Urine LARGE (*)     Leukocyte Esterase, Urine SMALL (*)     All other components within normal limits   ACETAMINOPHEN LEVEL - Abnormal; Notable for the following components:    Acetaminophen Level <5 (*)     All other components within normal limits    Narrative:     [place] E[place] tel. [phone],  Chemistry results called to and read back by Johanna Ramirez Rn, 06/05/2022 94:07,  by Cyndie Abts   SALICYLATE LEVEL - Abnormal; Notable for the following components:    Salicylate, Serum <0.6 (*)     All other components within normal limits    Narrative:     [place]a tel. [phone],  Chemistry results called to and read back by Johanna Ramirez Rn, 06/05/2022 10:51,  by Cyndie Abts   MICROSCOPIC URINALYSIS - Abnormal; Notable for the following components:    Bacteria, UA 1+ (*)     WBC, UA 79 (*)     Epithelial Cells, UA 15 (*)     All other components within normal limits   PROTIME-INR - Abnormal; Notable for the following components:    Protime 15.0 (*)     INR 1.19 (*)     All other components within normal limits   POCT GLUCOSE - Abnormal; Notable for the following components:    POC Glucose 193 (*)     All other components within normal limits   RAPID INFLUENZA A/B ANTIGENS   COVID-19, RAPID   CULTURE, URINE   LIPASE    Narrative:     CALL  [place]  Beka Donnelly,  Chemistry results called to and read back by Maryam Perry, 06/05/2022 10:51,  by Cyndie Abts   TROPONIN   ETHANOL    Narrative:     CALL  [place]  Bkea Donnelly,  Chemistry results called to and read back by Johanna Ramirez Rn, 06/05/2022 10:51,  by [address] [place] [address],[address] ACID   POCT GLUCOSE       EKG Results  EKG Interpretation    Interpreted by emergency department physician  Time performed: 1021  Time read: 1024    Rhythm: Sinus  Ventricular Rate: 105  QRS Axis: 22  Ectopy: None  Conduction: Normal sinus rhythm with severe motion artifact making EKG unreadable  ST Segments: Unable to ascertain  T Waves: Unable to ascertain  Q Waves: Unable to determine    Other findings: None    Compared to EKG on: 8/11/2021    Clinical Impression: Sinus tachycardia with severe motion artifact making EKG unreadable. It appears unchanged from her previous EKG on 8/11/2021. Tran 149, DO    Radiology results  CT Head WO Contrast   Final Result   No acute intracranial abnormality. CT CHEST PULMONARY EMBOLISM W CONTRAST   Final Result   1. Negative for pulmonary embolus. 2. Fatty infiltration of the liver. CT ABDOMEN PELVIS W IV CONTRAST Additional Contrast? None   Final Result   1. Negative for pulmonary embolus. 2. Fatty infiltration of the liver. Medications   droperidol (INAPSINE) injection 0.625 mg (0.625 mg IntraVENous Given 6/5/22 1002)   0.9 % sodium chloride bolus (0 mLs IntraVENous Stopped 6/5/22 1105)   levETIRAcetam (KEPPRA) 1,500 mg in sodium chloride 0.9 % 100 mL IVPB (0 mg IntraVENous Stopped 6/5/22 1031)   LORazepam (ATIVAN) tablet 0.5 mg (0.5 mg Oral Given 6/5/22 1006)   acetaminophen (TYLENOL) tablet 1,000 mg (1,000 mg Oral Given 6/5/22 1006)   sodium chloride 0.9 % 5,322 mL with folic acid 1 mg, adult multi-vitamin with vitamin k 10 mL, thiamine 100 mg ( IntraVENous New Bag 6/5/22 1137)   cefTRIAXone (ROCEPHIN) 1000 mg IVPB in 50 mL D5W minibag (0 mg IntraVENous Stopped 6/5/22 1248)   iopamidol (ISOVUE-370) 76 % injection 75 mL (75 mLs IntraVENous Given 6/5/22 1101)   0.9 % sodium chloride bolus (0 mLs IntraVENous Stopped 6/5/22 1248)       New Prescriptions    No medications on file       The patient's blood pressure was not found to be elevated according to CMS/Medicare and the Affordable Care Act/ObamaCare criteria.    IMPRESSIONS:  1. [facility]    2. History of alcohol abuse    3. Metabolic acidosis    4. Thrombocytopenia (HCC)    5. Elevated LFTs    6.  Acute cystitis without hematuria              Juli Davies, DO  06/05/22 [address] [facility], DO  06/05/22 130

## 2022-06-05 NOTE — ED NOTES
Pt with episode of emesis. SANTIAGO Kumar made aware, new orders placed.       Kenneth Duarte RN  06/05/22 7246

## 2022-06-05 NOTE — ED PROVIDER NOTES
[address]  [address] [address]  Dept: [phone]  Loc: [address] [place] [address]        This patient was seen and evaluated by myself in conjunction with the ED attending Dr. Micky Zapata    Chief Complaint   Patient presents with    Seizures     Pt to ED via [facility] d/t seizures. Per EMS report, pt had 2 seizures this morning, witnessed by her , both lasting approx 30-40 seconds. Pt has had 1 seizure in the past, was not rx seizure meds. HPI    Quinton Last is a 55 y.o. female who presents with a seizure that occurred shortly prior to arrival.  The quality of the seizure was supposedly convulsions witnessed by the , once about 5:30 AM and mother about 8:30 AM.  She was lying in bed when this happened. Patient does not remember this happening at all, she is alert and oriented currently. Supposedly according to EMS the seizures lasted about 30 to 45 seconds. She does have a history of seizures, she had one last year but was not placed on any AEDs. Her only complaint right now is that her lip hurts, where she bit it. Denies any recent cough, fevers or chills. Denies any abdominal pain, chest pain, shortness of breath, nausea vomiting or diarrhea recently. She has a history of a hysterectomy, is not pregnant. Patient was brought in via EMS for further evaluation and treatment. Family is not at bedside yet. REVIEW OF SYSTEMS    Neuro: see HPI  Cardiac: No chest pain or palpitations  Respiratory: No shortness of breath or new cough  General: No fevers or chills  : No dysuria or hematuria  GI: No vomiting or diarrhea  Musculoskeletal: Patient denies any shoulder pain or limited range of motion  See HPI for further details. All other systems reviewed and are negative.     PAST MEDICAL OR SURGICAL HISTORY    Past Medical History:   Diagnosis Date    Seizures ([facility] [address]      History reviewed. No pertinent surgical history. CURRENT MEDICATIONS  (may include discharge medications prescribed in the ED)      ALLERGIES    No Known Allergies    FAMILY OR SOCIAL HISTORY    History reviewed. No pertinent family history. Social History     Socioeconomic History    Marital status: Unknown     Spouse name: None    Number of children: None    Years of education: None    Highest education level: None   Occupational History    None   Tobacco Use    Smoking status: Never Smoker    Smokeless tobacco: Never Used   Vaping Use    Vaping Use: Never used   Substance and Sexual Activity    Alcohol use: Not Currently    Drug use: Not Currently    Sexual activity: Yes     Partners: Male   Other Topics Concern    None   Social History Narrative    None     Social Determinants of Health     Financial Resource Strain:     Difficulty of Paying Living Expenses: Not on file   Food Insecurity:     Worried About Running Out of Food in the Last Year: Not on file    Noe of Food in the Last Year: Not on file   Transportation Needs:     Lack of Transportation (Medical): Not on file    Lack of Transportation (Non-Medical):  Not on file   Physical Activity:     Days of Exercise per Week: Not on file    Minutes of Exercise per Session: Not on file   Stress:     Feeling of Stress : Not on file   Social Connections:     Frequency of Communication with Friends and Family: Not on file    Frequency of Social Gatherings with Friends and Family: Not on file    Attends Hindu Services: Not on file    Active Member of Clubs or Organizations: Not on file    Attends Club or Organization Meetings: Not on file    Marital Status: Not on file   Intimate Partner Violence:     Fear of Current or Ex-Partner: Not on file    Emotionally Abused: Not on file    Physically Abused: Not on file    Sexually Abused: Not on file   Housing Stability:     Unable to Pay for Housing in the Last Year: Not on file    Number of Places Lived in the Last Year: Not on file    Unstable Housing in the Last Year: Not on file       PHYSICAL EXAM    VITAL SIGNS: /75   Pulse (!) 102   Temp 99.7 °F (37.6 °C) (Oral)   Resp 18   Ht 5' 2\" (1.575 m)   Wt 136 lb (61.7 kg)   SpO2 97%   BMI 24.87 kg/m²   Constitutional:  Well developed, well nourished, no acute distress  Eyes:  Pupils equally round and reactive to light, sclera nonicteric  HENT:  External ears normal, nose normal, oropharynx moist, tongue normal.  She does have a couple of abrasions to her lower lip with some associated lip swelling where she states that she must bit it. NECK: Normal range of motion, no tenderness  Respiratory: Lungs clear to auscultation bilaterally, no respiratory distress, no wheezing   Cardiovascular:  regular rate, regular rhythm, no murmurs   GI:  Soft, nondistended, normal bowel sounds, nontender  Musculoskeletal:  no edema, no acute deformities   Integument:  Skin is warm and dry, no obvious rash   Neurologic:  Alert, oriented x4, no aphasia, no slurred speech, CN II-XII intact, normal finger to nose test bilaterally, 5/5 strength in all 4 extremities, sensation to light touch intact bilaterally  Vascular: Radial and DP pulses 2+ and equal bilaterally  Psych: Pleasant affect, no hallucinations        EKG    Please see the physician's note for EKG interpretation. RADIOLOGY  CT Head WO Contrast   Final Result   No acute intracranial abnormality. CT CHEST PULMONARY EMBOLISM W CONTRAST   Final Result   1. Negative for pulmonary embolus. 2. Fatty infiltration of the liver. CT ABDOMEN PELVIS W IV CONTRAST Additional Contrast? None   Final Result   1. Negative for pulmonary embolus. 2. Fatty infiltration of the liver.              LABS  Results for orders placed or performed during the hospital encounter of 06/05/22   Rapid influenza A/B antigens    Specimen: Nasopharyngeal   Result Value Ref Range Rapid Influenza A Ag Negative Negative    Rapid Influenza B Ag Negative Negative   COVID-19, Rapid    Specimen: Nasopharyngeal Swab   Result Value Ref Range    SARS-CoV-2, NAAT Not Detected Not Detected   CK   Result Value Ref Range    Total  (H) 26 - 192 U/L   Lactic Acid   Result Value Ref Range    Lactic Acid 3.7 (H) 0.4 - 2.0 mmol/L   CBC with Auto Differential   Result Value Ref Range    WBC 4.0 4.0 - 11.0 K/uL    RBC 3.64 (L) 4.00 - 5.20 M/uL    Hemoglobin 13.3 12.0 - 16.0 g/dL    Hematocrit 38.6 36.0 - 48.0 %    .0 (H) 80.0 - 100.0 fL    MCH 36.6 (H) 26.0 - 34.0 pg    MCHC 34.5 31.0 - 36.0 g/dL    RDW 14.1 12.4 - 15.4 %    Platelets 60 (L) 769 - 450 K/uL    MPV 8.4 5.0 - 10.5 fL    Neutrophils % 80.2 %    Lymphocytes % 10.9 %    Monocytes % 8.5 %    Eosinophils % 0.1 %    Basophils % 0.3 %    Neutrophils Absolute 3.2 1.7 - 7.7 K/uL    Lymphocytes Absolute 0.4 (L) 1.0 - 5.1 K/uL    Monocytes Absolute 0.3 0.0 - 1.3 K/uL    Eosinophils Absolute 0.0 0.0 - 0.6 K/uL    Basophils Absolute 0.0 0.0 - 0.2 K/uL   Basic Metabolic Panel w/ Reflex to MG   Result Value Ref Range    Sodium 133 (L) 136 - 145 mmol/L    Potassium reflex Magnesium 3.7 3.5 - 5.1 mmol/L    Chloride 89 (L) 99 - 110 mmol/L    CO2 14 (LL) 21 - 32 mmol/L    Anion Gap 30 (H) 3 - 16    Glucose 190 (H) 70 - 99 mg/dL    BUN 9 7 - 20 mg/dL    CREATININE 0.6 0.6 - 1.1 mg/dL    GFR Non-African American >60 >60    GFR African American >60 >60    Calcium 9.4 8.3 - 10.6 mg/dL   Hepatic Function Panel   Result Value Ref Range    Total Protein 8.3 (H) 6.4 - 8.2 g/dL    Albumin 4.8 3.4 - 5.0 g/dL    Alkaline Phosphatase 140 (H) 40 - 129 U/L     (H) 10 - 40 U/L     (H) 15 - 37 U/L    Total Bilirubin 2.0 (H) 0.0 - 1.0 mg/dL    Bilirubin, Direct 0.7 (H) 0.0 - 0.3 mg/dL    Bilirubin, Indirect 1.3 (H) 0.0 - 1.0 mg/dL   Lipase   Result Value Ref Range    Lipase 54.0 13.0 - 60.0 U/L   Troponin   Result Value Ref Range    Troponin <0.01 <0.01 ng/mL   Urinalysis with Reflex to Culture    Specimen: Urine   Result Value Ref Range    Color, UA Yellow Straw/Yellow    Clarity, UA CLOUDY (A) Clear    Glucose, Ur Negative Negative mg/dL    Bilirubin Urine Negative Negative    Ketones, Urine >=160 (A) Negative mg/dL    Specific Gravity, UA 1.023 1.005 - 1.030    Blood, Urine LARGE (A) Negative    pH, UA 5.5 5.0 - 8.0    Protein,  Negative mg/dL    Urobilinogen, Urine 0.2 <2.0 E.U./dL    Nitrite, Urine Negative Negative    Leukocyte Esterase, Urine SMALL (A) Negative    Microscopic Examination YES     Urine Type NotGiven     Urine Reflex to Culture Yes    Ethanol   Result Value Ref Range    Ethanol Lvl None Detected mg/dL   Urine Drug Screen   Result Value Ref Range    Amphetamine Screen, Urine Neg Negative <1000ng/mL    Barbiturate Screen, Ur Neg Negative <200 ng/mL    Benzodiazepine Screen, Urine Neg Negative <200 ng/mL    Cannabinoid Scrn, Ur Neg Negative <50 ng/mL    Cocaine Metabolite Screen, Urine Neg Negative <300 ng/mL    Opiate Scrn, Ur Neg Negative <300 ng/mL    PCP Screen, Urine Neg Negative <25 ng/mL    Methadone Screen, Urine Neg Negative <300 ng/mL    Propoxyphene Scrn, Ur Neg Negative <300 ng/mL    Oxycodone Urine Neg Negative <100 ng/ml    Drug Screen Comment: see below    Acetaminophen Level   Result Value Ref Range    Acetaminophen Level <5 (L) 10 - 30 ug/mL   Salicylate   Result Value Ref Range    Salicylate, Serum <1.9 (L) 15.0 - 30.0 mg/dL   Microscopic Urinalysis   Result Value Ref Range    RBC, UA 3-4 0 - 4 /HPF    Bacteria, UA 1+ (A) None Seen /HPF    Urinalysis Comments see below     Hyaline Casts, UA 0 0 - 8 /LPF    WBC, UA 79 (H) 0 - 5 /HPF    Epithelial Cells, UA 15 (H) 0 - 5 /HPF   Ammonia   Result Value Ref Range    Ammonia 28 11 - 51 umol/L   Protime-INR   Result Value Ref Range    Protime 15.0 (H) 11.7 - 14.5 sec    INR 1.19 (H) 0.87 - 1.14   POCT Glucose   Result Value Ref Range    POC Glucose 193 (H) 70 - 99 mg/dl Performed on [facility]            ED [facility]    Pertinent Labs & Imaging studies reviewed and interpreted. (See chart for details)  See chart for details of medications given during the ED stay. Differential diagnosis: status epilepticus, breakthrough seizure, intracranial bleed, brain tumor, hypoglycemia, neck fracture or other orthopedic fractures, posterior shoulder dislocation, tongue laceration, other    CRITICAL CARE NOTE:  There was a high probability of clinically significant life-threatening deterioration of the patient's condition requiring my urgent intervention. Total critical care time was at least 30 minutes. This includes vital sign monitoring, pulse oximetry monitoring, telemetry monitoring, clinical response to the IV medications, reviewing the nursing notes, consultation time, dictation/documentation time, and interpretation of the labwork. This excludes any separately billable procedures performed. The critical care time above also includes time spent obtaining history from electronic chart as pt was an unreliable historian AND the history obtained was directly relevant to the care of the patient. Patient is afebrile and nontoxic in appearance. She technically is afebrile but she does have a low-grade temp of 100.5 °F orally. Is normotensive. Heart rate is somewhat elevated at 101 but just borderline. She is not hypoxic or in any acute respiratory distress. She does have an elevated CK and lactic, does have a metabolic acidosis with a CO2 of 14. Likely secondary to the seizure. Point-of-care glucose was 193. She does have a slight coagulopathy with thrombocytopenia, INR is 1.19 and a platelet count of 60. This is decreased from her previous blood work. She has no petechiae or petechial rash noted. No leukocytosis or anemia.   Urine does have 1+ bacteria was 79 WBCs, started her on Rocephin send urine culture for culturing and sensitivities. Urine drug screen is unremarkable. Troponin negative. Acetaminophen and salicylate levels negative. Rapid COVID and flu negative. CTs as read by the radiologist as above    Patient has a worsening hepatic function panel despite her saying that she does not drink anymore since last years DT seizure. However her blood work/hepatic panel continues to trend into a worse direction consistent with more of a chronic alcoholic, is likely not being forthcoming that she is continued to drink since last year. Her alk phos is 140, , , total bili 2 with a bili direct of 0.7 and bili indirect of 1.3. Lipase unremarkable. She had no ethanol in her system, her CIWA is high though. She was given Ativan here. Given her metabolic acidosis, likely seizure secondary to alcohol withdrawal/abuse concern I do believe she needs to be admitted for further evaluation and treatment. Therefore consult to the hospitalist who agreed to admit patient and write orders for admission    FINAL IMPRESSION    1. New onset seizure ([address]    2. History of alcohol abuse    3. Metabolic acidosis    4. Thrombocytopenia (HCC)    5. Elevated LFTs    6.  Acute cystitis without hematuria        PLAN  Admission     (Please note that this note was completed with a voice recognition program.  Every attempt was made to edit the dictations, but inevitably there remain words that are mis-transcribed.)          KIYA Barry - CNP  06/05/22 5707

## 2022-06-05 NOTE — H&P
Department of Internal Medicine  General Internal Medicine   History and Physical      CHIEF COMPLAINT:  seizures     HISTORY OF PRESENT ILLNESS:    55 y.o. female with remote h/o seizures (1.5 years ago) and etoh (vodka) abuse presented after having couple episodes of seizures at home. Her seizure episodes were witnessed by the , once about 5:30 AM and another about 8:30 AM. She was lying in bed when this happened. Patient has no recollection of these episodes. Supposedly according to EMS, the seizures lasted about 30 to 45 seconds. She does have a history of seizures about year and a half ado when she was binge drinking vodka. She had been sober up until few months ago. She had some vodka few weeks ago and a beer last night. Not on any AED at home. She is alert and oriented currently. Denies any recent cough, fevers or chills. Denies any abdominal pain, chest pain, shortness of breath, nausea vomiting or diarrhea recently. Wants to eat. Denies known h/o liver disease.       Past Medical History:        Diagnosis Date    Seizures [facility])      Past Surgical History:    History reviewed. No pertinent surgical history. Medications Prior to Admission:    No current facility-administered medications on file prior to encounter. No current outpatient medications on file prior to encounter. Allergies:  Patient has no known allergies. Social History:           Socioeconomic History    Marital status: Unknown       Spouse name: None    Number of children: None    Years of education: None    Highest education level: None   Occupational History    None   Tobacco Use    Smoking status: Never Smoker    Smokeless tobacco: Never Used   Vaping Use    Vaping Use: Never used   Substance and Sexual Activity    Alcohol use: Not Currently    Drug use: Not Currently    Sexual activity: Yes       Partners: Male         Family History:   History reviewed. No pertinent family history.   REVIEW OF SYSTEMS:  Positives and pertinent negatives as per HPI. PHYSICAL EXAM:    Vitals:  /75   Pulse (!) 102   Temp 99.7 °F (37.6 °C) (Oral)   Resp 18   Ht 5' 2\" (1.575 m)   Wt 136 lb (61.7 kg)   SpO2 97%   BMI 24.87 kg/m²     Constitutional:  Well developed, well nourished, no acute distress  Eyes:  Pupils equally round and reactive to light, sclera nonicteric  HENT:  External ears normal, nose normal, oropharynx moist, tongue normal.  She does have a couple of abrasions to her lower lip with some associated lip swelling where she states that she must bit it. NECK: Normal range of motion, no tenderness  Respiratory: Lungs clear to auscultation bilaterally, no respiratory distress, no wheezing   Cardiovascular:  regular rate, regular rhythm, no murmurs   GI:  Soft, nondistended, normal bowel sounds, nontender  Musculoskeletal:  no edema, no acute deformities   Integument:  Skin is warm and dry, no obvious rash   Neurologic:  Alert, oriented x4, no aphasia, no slurred speech, CN II-XII intact, normal finger to nose test bilaterally, 5/5 strength in all 4 extremities, sensation to light touch intact bilaterally  Vascular: Radial and DP pulses 2+ and equal bilaterally  Psych: Pleasant affect, no hallucinations       DATA:     Radiology results  CT Head WO Contrast   Final Result   No acute intracranial abnormality.           CT CHEST PULMONARY EMBOLISM W CONTRAST   Final Result   1. Negative for pulmonary embolus. 2. Fatty infiltration of the liver.           CT ABDOMEN PELVIS W IV CONTRAST Additional Contrast? None   Final Result   1. Negative for pulmonary embolus. 2. Fatty infiltration of the liver.           ASSESSMENT AND PLAN:      New onset seizure  - Possibly secondary to alcohol withdrawal vs. convulsive disorder   - Urine drug screen is unremarkable.    - Acetaminophen and salicylate levels negative  - Neurology consulted  - Loading dose of keppra given in ED  - Cont PO keppra for now   - May

## 2022-06-05 NOTE — PROGRESS NOTES
Pharmacy Medication Reconciliation Note     List of medications Evan Mireles is currently taking is complete. Source of information:   1. Conversation with patient at bedside  2. EMR    Notes regarding home medications:   1.  Patient reports only MVI and APAP PM on a regular basis--not maintenance meds     Patient denies taking any OTC or herbal medications other than those listed     Pily Blackwood, Pharmacy Intern  6/5/2022  2:46 PM

## 2022-06-05 NOTE — ED NOTES
ED SBAR report provider to [facility]. Patient to be transported to Room 4121 via stretcher by transport tech. Patient transported with bedside cardiac monitor and with IV medications infusing. IV site clean, dry, and intact. MEWS score and pain assessed and documented. Updated patient and family on plan of care.      Rivas Conner RN  06/05/22 [id number]

## 2022-06-06 ENCOUNTER — APPOINTMENT (OUTPATIENT)
Dept: MRI IMAGING | Age: 47
DRG: 053 | End: 2022-06-06
Payer: COMMERCIAL

## 2022-06-06 LAB
A/G RATIO: 1.6 (ref 1.1–2.2)
ALBUMIN SERPL-MCNC: 4.7 G/DL (ref 3.4–5)
ALP BLD-CCNC: 116 U/L (ref 40–129)
ALT SERPL-CCNC: 98 U/L (ref 10–40)
ANION GAP SERPL CALCULATED.3IONS-SCNC: 26 MMOL/L (ref 3–16)
AST SERPL-CCNC: 124 U/L (ref 15–37)
BASOPHILS ABSOLUTE: 0 K/UL (ref 0–0.2)
BASOPHILS RELATIVE PERCENT: 0.3 %
BILIRUB SERPL-MCNC: 1.2 MG/DL (ref 0–1)
BUN BLDV-MCNC: 3 MG/DL (ref 7–20)
CALCIUM SERPL-MCNC: 7.8 MG/DL (ref 8.3–10.6)
CHLORIDE BLD-SCNC: 93 MMOL/L (ref 99–110)
CO2: 15 MMOL/L (ref 21–32)
CREAT SERPL-MCNC: <0.5 MG/DL (ref 0.6–1.1)
EKG ATRIAL RATE: 105 BPM
EKG DIAGNOSIS: NORMAL
EKG P AXIS: 40 DEGREES
EKG P-R INTERVAL: 122 MS
EKG Q-T INTERVAL: 360 MS
EKG QRS DURATION: 72 MS
EKG QTC CALCULATION (BAZETT): 475 MS
EKG R AXIS: 22 DEGREES
EKG T AXIS: 26 DEGREES
EKG VENTRICULAR RATE: 105 BPM
EOSINOPHILS ABSOLUTE: 0.1 K/UL (ref 0–0.6)
EOSINOPHILS RELATIVE PERCENT: 1.3 %
GFR AFRICAN AMERICAN: >60
GFR NON-AFRICAN AMERICAN: >60
GLUCOSE BLD-MCNC: 81 MG/DL (ref 70–99)
HCT VFR BLD CALC: 34 % (ref 36–48)
HEMOGLOBIN: 11.9 G/DL (ref 12–16)
LACTIC ACID: 0.7 MMOL/L (ref 0.4–2)
LYMPHOCYTES ABSOLUTE: 0.5 K/UL (ref 1–5.1)
LYMPHOCYTES RELATIVE PERCENT: 13.1 %
MAGNESIUM: 1.8 MG/DL (ref 1.8–2.4)
MCH RBC QN AUTO: 36.9 PG (ref 26–34)
MCHC RBC AUTO-ENTMCNC: 35.1 G/DL (ref 31–36)
MCV RBC AUTO: 105.1 FL (ref 80–100)
MONOCYTES ABSOLUTE: 0.4 K/UL (ref 0–1.3)
MONOCYTES RELATIVE PERCENT: 11.5 %
NEUTROPHILS ABSOLUTE: 2.8 K/UL (ref 1.7–7.7)
NEUTROPHILS RELATIVE PERCENT: 73.8 %
PDW BLD-RTO: 14.6 % (ref 12.4–15.4)
PLATELET # BLD: 51 K/UL (ref 135–450)
PMV BLD AUTO: 8.5 FL (ref 5–10.5)
POTASSIUM REFLEX MAGNESIUM: 3.4 MMOL/L (ref 3.5–5.1)
RBC # BLD: 3.23 M/UL (ref 4–5.2)
SODIUM BLD-SCNC: 134 MMOL/L (ref 136–145)
TOTAL PROTEIN: 7.7 G/DL (ref 6.4–8.2)
URINE CULTURE, ROUTINE: NORMAL
WBC # BLD: 3.8 K/UL (ref 4–11)

## 2022-06-06 PROCEDURE — 70553 MRI BRAIN STEM W/O & W/DYE: CPT

## 2022-06-06 PROCEDURE — 83735 ASSAY OF MAGNESIUM: CPT

## 2022-06-06 PROCEDURE — 2580000003 HC RX 258: Performed by: INTERNAL MEDICINE

## 2022-06-06 PROCEDURE — 93010 ELECTROCARDIOGRAM REPORT: CPT | Performed by: INTERNAL MEDICINE

## 2022-06-06 PROCEDURE — A9577 INJ MULTIHANCE: HCPCS | Performed by: NURSE PRACTITIONER

## 2022-06-06 PROCEDURE — 85025 COMPLETE CBC W/AUTO DIFF WBC: CPT

## 2022-06-06 PROCEDURE — 6360000004 HC RX CONTRAST MEDICATION: Performed by: NURSE PRACTITIONER

## 2022-06-06 PROCEDURE — 2500000003 HC RX 250 WO HCPCS: Performed by: INTERNAL MEDICINE

## 2022-06-06 PROCEDURE — 6370000000 HC RX 637 (ALT 250 FOR IP): Performed by: INTERNAL MEDICINE

## 2022-06-06 PROCEDURE — 94760 N-INVAS EAR/PLS OXIMETRY 1: CPT

## 2022-06-06 PROCEDURE — 6360000002 HC RX W HCPCS: Performed by: INTERNAL MEDICINE

## 2022-06-06 PROCEDURE — 83605 ASSAY OF LACTIC ACID: CPT

## 2022-06-06 PROCEDURE — 36415 COLL VENOUS BLD VENIPUNCTURE: CPT

## 2022-06-06 PROCEDURE — 80053 COMPREHEN METABOLIC PANEL: CPT

## 2022-06-06 PROCEDURE — 1200000000 HC SEMI PRIVATE

## 2022-06-06 RX ADMIN — GADOBENATE DIMEGLUMINE 14 ML: 529 INJECTION, SOLUTION INTRAVENOUS at 16:44

## 2022-06-06 RX ADMIN — SODIUM CHLORIDE, PRESERVATIVE FREE 5 ML: 5 INJECTION INTRAVENOUS at 20:19

## 2022-06-06 RX ADMIN — LEVETIRACETAM 500 MG: 500 TABLET, FILM COATED ORAL at 20:19

## 2022-06-06 RX ADMIN — THERA TABS 1 TABLET: TAB at 08:26

## 2022-06-06 RX ADMIN — SODIUM CHLORIDE, PRESERVATIVE FREE 10 ML: 5 INJECTION INTRAVENOUS at 08:32

## 2022-06-06 RX ADMIN — SODIUM CHLORIDE 10 ML/HR: 9 INJECTION, SOLUTION INTRAVENOUS at 08:30

## 2022-06-06 RX ADMIN — LEVETIRACETAM 500 MG: 500 TABLET, FILM COATED ORAL at 08:26

## 2022-06-06 RX ADMIN — CEFTRIAXONE 1000 MG: 1 INJECTION, POWDER, FOR SOLUTION INTRAMUSCULAR; INTRAVENOUS at 08:31

## 2022-06-06 RX ADMIN — FOLIC ACID: 5 INJECTION, SOLUTION INTRAMUSCULAR; INTRAVENOUS; SUBCUTANEOUS at 01:04

## 2022-06-06 ASSESSMENT — PAIN SCALES - GENERAL
PAINLEVEL_OUTOF10: 0

## 2022-06-06 NOTE — CONSULTS
Neurology Consult Note  Reason for Consult: seizure disorder    Chief complaint: seizure    Dr Yves Wright MD asked me to see Jerilynn Castleman in consultation for evaluation of seizure disorder    History of Present Illness:  Jerilynn Castleman is a 55 y.o. female who presents with seizure. I obtained my information via interview w/ the patient, supplemented by chart review. The patient tells me that a few days ago she had back to back seizures. These were witnessed by her significant other. It was reported they lasted for 30-45 seconds, she was completely unresponsive, w/ generalized shaking. She did bite parts of her tongue and says that she bit right through her bottom lip. She did mention that the night before all of this happened she briefly lost consciousness striking her jaw on the floor though no convulsive behavior. She ended up coming to the ED yesterday morning in order to be evaluated. CT head was w/out any acute findings. UA was abnormal.  Liver function is abnormal.  Initial temperature was 100.5F though OK now. BP fine. She was loaded w/ 1500 mg Keppra. Currently she feels better and has not had any further seizures. She was evaluated by at [facility] in Nov 2020 for suspected ETOH related seizure. She tells me that she only drinks maybe one beer per night currently. No illicit drug use. No recent illness. Medical History:  Past Medical History:   Diagnosis Date    Seizures ([address]      History reviewed. No pertinent surgical history.      Scheduled Meds:   cefTRIAXone (ROCEPHIN) IV  1,000 mg IntraVENous Q24H    sodium chloride flush  5-40 mL IntraVENous 2 times per day    multivitamin  1 tablet Oral Daily    levETIRAcetam  500 mg Oral BID     Continuous Infusions:   sodium chloride 10 mL/hr (06/06/22 0830)     Medications Prior to Admission:   Multiple Vitamins-Minerals (THERAPEUTIC MULTIVITAMIN-MINERALS) tablet, Take 1 tablet by mouth daily  diphenhydrAMINE-APAP, sleep, (TYLENOL PM EXTRA STRENGTH)  MG tablet, Take 1 tablet by mouth nightly as needed for Sleep    No Known Allergies    History reviewed. No pertinent family history. Social History     Tobacco Use   Smoking Status Never Smoker   Smokeless Tobacco Never Used     Social History     Substance and Sexual Activity   Drug Use Not Currently     Social History     Substance and Sexual Activity   Alcohol Use Not Currently     ROS  Constitutional- No weight loss or fevers  Eyes- No diplopia. No photophobia. Ears/nose/throat- No dysphagia. No Dysarthria  Cardiovascular- No palpitations. No chest pain  Respiratory- No dyspnea. No Cough  Gastrointestinal- No Abdominal pain. No Vomiting. Genitourinary- No incontinence. No urinary retention  Musculoskeletal- No myalgia. No arthralgia  Skin- No rash. No easy bruising. Psychiatric- No depression. No anxiety  Endocrine- No diabetes. No thyroid issues. Hematologic- No bleeding difficulty. No fatigue  Neurologic- No weakness. No Headache. Exam  Blood pressure 120/79, pulse 98, temperature 98.1 °F (36.7 °C), temperature source Oral, resp. rate 17, height 5' 2\" (1.575 m), weight 153 lb 3.5 oz (69.5 kg), SpO2 98 %. Constitutional    Vital signs: BP, HR, and RR reviewed   General alert, no distress. Tongue/lower lip trauma. Eyes: unable to visualize the fundi  Cardiovascular: no peripheral edema. Psychiatric: cooperative with examination, no psychotic behavior noted. Neurologic  Mental status:   orientation to person, place, time.      General fund of knowledge grossly intact   Memory grossly intact   Attention intact as able to attend well to the exam     Language fluent in conversation   Comprehension intact; follows simple commands  Cranial nerves:   CN2: visual fields full   CN 3,4,6: extraocular muscles intact  CN5: facial sensation symmetric   CN7: face symmetric without dysarthria  CN8: hearing grossly intact  CN9: palate elevated symmetrically  CN11: trap full strength on shoulder shrug  CN12: tongue midline with protrusion  Strength: good strength in all 4 extremities   Sensory: light touch intact in all 4 extremities  Cerebellar/coordination: finger nose finger normal without ataxia  Tone: normal in all 4 extremities  Gait: deferred at this time for safety. Labs  Glucose 190  Na 133  K 3.7  Mg 1.50  CO2 - 14  BUN 9   Cr 0.6        Alk Phos 140      Ammonia 28    WBC 3.8K  Hg 11.9  Platelets 51    ETOH negative  Drug screen negatvie    COVID negative  Flu negative  UA small LE 79 WBC, 1+ bacteria. Culture pending. Blood cultures pending    Studies  CT head w/o 6/5/22, independently reviewed  No acute intracranial abnormality. Impression  1. Multiple reported GTC seizures. She may have a UTI. She did have one other seizure though this was felt to be related to ETOH use/abuse. She denies any current overuse. 2.  Mildly elevated CK. 3.  Abnormal liver panel. 4.  Thrombocytopenia. 5.  Abnormal UA. Recommendations  1. MRI brain w/wo, EEG. 2.  OK w/ Keppra 500 mg BID. Discussed risks, benefits, side effects. 3.  Seizure precautions. No driving for at least 3 months, seizure free with clearance from a physician, as well as refraining from activities that could be of danger to herself or others should she have another seizure such as swimming, bathing, operating heavy machinery, climbing heights or watching young children who do not have the ability to call for help if needed.       Layla Mata NP  [address] [facility] [address] Neurology    A copy of this note was provided for Dr Caryl Gavin MD

## 2022-06-06 NOTE — PLAN OF CARE
Problem: Discharge Planning  Goal: Discharge to home or other facility with appropriate resources  6/6/2022 0139 by Jaycee Hogue RN  Outcome: Progressing  6/5/2022 1825 by Jorgito Alonzo RN  Outcome: Progressing     Problem: Pain  Goal: Verbalizes/displays adequate comfort level or baseline comfort level  6/6/2022 0139 by Jaycee Hogue RN  Outcome: Progressing  6/5/2022 1825 by Jorgito Alonzo RN  Outcome: Progressing     Problem: Safety - Adult  Goal: Free from fall injury  6/6/2022 0139 by Jaycee Hogue RN  Outcome: Progressing  6/5/2022 1825 by Jorgito Alonzo RN  Outcome: Progressing

## 2022-06-06 NOTE — CONSULTS
DISCHARGE PLAN: Pt plans to return home with her spouse at d/c. Spouse or parents will transport pt home. Pt denied having any substance use disorder.   _____________________________________  INITIAL ASSESSMENT  Met w/pt to address barriers to dc. HOME: Pt reported that she resides in a single family home with her spouse and 1 dog.  0 JUAN CARLOS    Disease Specific: Seizure    COVID Vaccination: Overdue for booster    DME/O2: None. No DME needs noted. ACTIVE SERVICES: Pt reported that she was independent with all self care PTA and denied the need for any assistance upon d/c. TRANSPORTATION: Pt is an active  and stated that her spouse or parents will transport her home at d/c. PHARMACY: Denies difficulty obtaining/taking meds. Pt has all meds filled at [facility] on [address]. PCP: Pt stated that she currently does not have a PCP and is looking for a PCP that she can feel comfortable with. PCP list given. DEMOGRAPHICS: Verified address/phone number as correct    INSURANCE:  [employer]  PRESCRIPTION COVERAGE: [employer]    HD/PD: No    THERAPY RECS Not ordered      Discharge planning team will remain available for needs. Please consult for any specifics not addressed in this note.     IESHA Virk  [phone]  Electronically signed by Nelly Seen on 6/6/2022 at 3:04 PM

## 2022-06-06 NOTE — PLAN OF CARE
Problem: Discharge Planning  Goal: Discharge to home or other facility with appropriate resources  6/6/2022 1119 by Glo Gongora RN  Outcome: Progressing  6/6/2022 0139 by Fernand Moritz, RN  Outcome: Progressing     Problem: Pain  Goal: Verbalizes/displays adequate comfort level or baseline comfort level  6/6/2022 1119 by Glo Gongora RN  Outcome: Progressing  6/6/2022 0139 by Fernand Moritz, RN  Outcome: Progressing     Problem: Safety - Adult  Goal: Free from fall injury  6/6/2022 1119 by Glo Gongora RN  Outcome: Progressing  6/6/2022 0139 by Fernand Moritz, RN  Outcome: Progressing

## 2022-06-06 NOTE — PROGRESS NOTES
Hospitalist Progress Note      PCP: Ernesto Mac    Date of Admission: 6/5/2022    Chief Complaint:   Seizures    Hospital Course:   55 y.o. female with remote h/o seizures (1.5 years ago) and alcohol abuse  (vodka) abuse. She was admitted for seizures at home witnessed by the , once about 5:30 AM and another about 8:30 AM whilst lying in bed. She had no recollection of these episodes. The seizures lasted about 30 to 39 seconds. She has a history of seizures about year and a half ago when she was binge drinking vodka. She had been sober up until few months ago. She had some vodka few weeks ago and a beer last night. Not on any AED at home. She is alert and oriented currently. Denies any recent cough, fevers or chills. Denies any abdominal pain, chest pain, shortness of breath, nausea vomiting or diarrhea recently. Wants to eat. Denies known h/o liver disease. Subjective:   Feeling better.       Medications:  Reviewed    Infusion Medications    sodium chloride 10 mL/hr (06/06/22 0830)     Scheduled Medications    cefTRIAXone (ROCEPHIN) IV  1,000 mg IntraVENous Q24H    sodium chloride flush  5-40 mL IntraVENous 2 times per day    multivitamin  1 tablet Oral Daily    levETIRAcetam  500 mg Oral BID     PRN Meds: droperidol, sodium chloride flush, sodium chloride, ondansetron **OR** ondansetron, polyethylene glycol, acetaminophen **OR** acetaminophen, LORazepam **OR** LORazepam **OR** LORazepam **OR** LORazepam **OR** LORazepam **OR** LORazepam **OR** LORazepam **OR** LORazepam      Intake/Output Summary (Last 24 hours) at 6/6/2022 1240  Last data filed at 6/6/2022 0630  Gross per 24 hour   Intake 1561.41 ml   Output --   Net 1561.41 ml       Physical Exam Performed:    /73   Pulse (!) 101   Temp 98.8 °F (37.1 °C) (Oral)   Resp 16   Ht 5' 2\" (1.575 m)   Wt 153 lb 3.5 oz (69.5 kg)   SpO2 98%   BMI 28.02 kg/m²     Constitutional:  Well developed, well nourished, no acute distress  Eyes:  Pupils equally round and reactive to light, sclera nonicteric  HENT:  External ears normal, nose normal, oropharynx moist, tongue normal.  She does have a couple of abrasions to her lower lip with some associated lip swelling where she states that she must bit it. NECK: Normal range of motion, no tenderness  Respiratory: Lungs clear to auscultation bilaterally, no respiratory distress, no wheezing   Cardiovascular:  regular rate, regular rhythm, no murmurs   GI:  Soft, nondistended, normal bowel sounds, nontender  Musculoskeletal:  no edema, no acute deformities   Integument:  Skin is warm and dry, no obvious rash   Neurologic:  Alert, oriented x4, no aphasia, no slurred speech, CN II-XII intact, normal finger to nose test bilaterally, 5/5 strength in all 4 extremities, sensation to light touch intact bilaterally  Vascular: Radial and DP pulses 2+ and equal bilaterally  Psych: Pleasant affect, no hallucinations      Labs:   Recent Labs     06/05/22  0956 06/06/22  0559   WBC 4.0 3.8*   HGB 13.3 11.9*   HCT 38.6 34.0*   PLT 60* 51*     Recent Labs     06/05/22  0956 06/05/22  1806 06/06/22  0559   * 135* 134*   K 3.7 3.6 3.4*   CL 89* 96* 93*   CO2 14* 14* 15*   BUN 9 5* 3*   CREATININE 0.6 <0.5* <0.5*   CALCIUM 9.4 7.6* 7.8*     Recent Labs     06/05/22  0956 06/06/22  0559   * 124*   * 98*   BILIDIR 0.7*  --    BILITOT 2.0* 1.2*   ALKPHOS 140* 116     Recent Labs     06/05/22  1134   INR 1.19*     Recent Labs     06/05/22  0956   CKTOTAL 379*   TROPONINI <0.01       Urinalysis:      Lab Results   Component Value Date    NITRU Negative 06/05/2022    WBCUA 79 06/05/2022    BACTERIA 1+ 06/05/2022    RBCUA 3-4 06/05/2022    BLOODU LARGE 06/05/2022    SPECGRAV 1.023 06/05/2022    GLUCOSEU Negative 06/05/2022       Radiology:  CT Head WO Contrast   Final Result   No acute intracranial abnormality. CT CHEST PULMONARY EMBOLISM W CONTRAST   Final Result   1.  Negative for

## 2022-06-07 VITALS
SYSTOLIC BLOOD PRESSURE: 139 MMHG | OXYGEN SATURATION: 96 % | TEMPERATURE: 98.2 F | WEIGHT: 150.35 LBS | HEART RATE: 121 BPM | RESPIRATION RATE: 16 BRPM | BODY MASS INDEX: 27.67 KG/M2 | DIASTOLIC BLOOD PRESSURE: 89 MMHG | HEIGHT: 62 IN

## 2022-06-07 PROCEDURE — 2580000003 HC RX 258: Performed by: INTERNAL MEDICINE

## 2022-06-07 PROCEDURE — 6370000000 HC RX 637 (ALT 250 FOR IP): Performed by: INTERNAL MEDICINE

## 2022-06-07 PROCEDURE — 95819 EEG AWAKE AND ASLEEP: CPT

## 2022-06-07 RX ADMIN — THERA TABS 1 TABLET: TAB at 08:22

## 2022-06-07 RX ADMIN — SODIUM CHLORIDE, PRESERVATIVE FREE 10 ML: 5 INJECTION INTRAVENOUS at 08:22

## 2022-06-07 ASSESSMENT — PAIN SCALES - GENERAL: PAINLEVEL_OUTOF10: 0

## 2022-06-07 NOTE — PROGRESS NOTES
Patient returned to the floor with both of her parents. Patient had been waiting for a ride outside of hospital since leaving AMA. , Adamaris Haque, was notified that patient had left her watch in the room. Patient returned to get watch, and parents were asking for information about patient admission, status, and AMA. After receiving permission from Maxx Cedeno to speak to parents, the information was given, questions answered. Patient asked for copy of her AMA form. Gave patient the medical records phone number to obtain records. Patient still agitated. Parents state that González Boyd is an acoholic and is in denial about her disease preventing her from receiving help. Patient was under the impression that she was able to leave and come back for follow up, however it was explained by MD as well as RN of the lack of discharge order and that leaving would be against medical advice. Parents thankful for the information, patient still upset and argumentative. Patient left with parents.

## 2022-06-07 NOTE — PROGRESS NOTES
Patient alert and oriented, VSS, sitting up on the couch in her room. Patient anxious and agitated, states she wants to leave. Patient also states she does not want to take the seizure medications prescribed stating \"I do not want to put that stuff in my body. I have only had 3 seizures in 5 years, I don't need that medicine. \"  Furthermore, patient states she is not sure she wants to follow through with the EEG today and just wants to go home. Patient agreeable at this time. Patient also took out her own IV, states \"it hurt, so I took it out\"  Refuses new IV access. CIWA score of 9, no ativan given d/t pending EEG at 0930 per testing request.  Patient walking independently in room and hallway, refusing safety alarms. Patient denies n/v, diarrhea, SOB, pain. Bed in lowest and locked position. Non-slip socks on, call light in reach. Will continue to monitor pt needs.

## 2022-06-07 NOTE — PLAN OF CARE
Problem: Discharge Planning  Goal: Discharge to home or other facility with appropriate resources  6/7/2022 0043 by Adrien Rodriguez RN  Outcome: Progressing  6/6/2022 1119 by Brent Finley RN  Outcome: Progressing     Problem: Pain  Goal: Verbalizes/displays adequate comfort level or baseline comfort level  6/7/2022 0043 by Adrien Rodriguez RN  Outcome: Progressing  6/6/2022 1119 by Brent Finley RN  Outcome: Progressing     Problem: Safety - Adult  Goal: Free from fall injury  6/7/2022 0043 by Adrien Rodriguez RN  Outcome: Progressing  6/6/2022 1119 by Brent Finley RN  Outcome: Progressing     Problem: ABCDS Injury Assessment  Goal: Absence of physical injury  Outcome: Progressing

## 2022-06-07 NOTE — PLAN OF CARE
Problem: Discharge Planning  Goal: Discharge to home or other facility with appropriate resources  6/7/2022 0940 by Gaurav Santos RN  Outcome: Progressing  6/7/2022 0043 by Nicol Granados RN  Outcome: Progressing     Problem: Pain  Goal: Verbalizes/displays adequate comfort level or baseline comfort level  6/7/2022 0940 by Gaurav Santos RN  Outcome: Adequate for Discharge  6/7/2022 0043 by Nicol Granados RN  Outcome: Progressing     Problem: Safety - Adult  Goal: Free from fall injury  6/7/2022 0940 by Gaurav Santos RN  Outcome: Progressing  6/7/2022 0043 by Nicol Granados RN  Outcome: Progressing     Problem: ABCDS Injury Assessment  Goal: Absence of physical injury  6/7/2022 0940 by Gaurav Santos RN  Outcome: Progressing  6/7/2022 0043 by Nicol Granados RN  Outcome: Progressing

## 2022-06-07 NOTE — PROGRESS NOTES
Patient scored 12 on her CIWA assessment which qualified her for 2 mg ativan per CIWA score. Patient refused to take meds and is stating that she will be going home. She seems to be withdrawaling as she had increased agitation, tremors  so further education provided, patient still refused. Will reassess and continue to monitor.  Sundar Montanez RN 6/7/2022 4:22 AM

## 2022-06-07 NOTE — PROGRESS NOTES
Photic Stimulator not available    EEG completed and available for interpretation on the [employer].

## 2022-06-07 NOTE — PROGRESS NOTES
Patient came out to nurse station and took of heart monitor off. Patient was wanting to leave. Explained to patient that she would have to wait to be discharged with a doctors order or she could sign herself out by [facility]. Explained to patient education on leaving AMA. Patient was not happy with this information. Patient argumentative with health care staff about how long she has been in the hospital. Patient states the doctors are dragging her stay here in the hospital. Patient now back in room 4121. Primary nurse aware and present during exchange with patient at nursing station.  Electronically signed by Kaley Rodrigues RN on 6/7/2022 at 3:46 AM

## 2022-06-07 NOTE — PLAN OF CARE
Problem: Discharge Planning  Goal: Discharge to home or other facility with appropriate resources  6/7/2022 1136 by Richard Wells RN  Outcome: Completed  6/7/2022 0940 by Richard Wells RN  Outcome: Progressing  6/7/2022 0043 by Ally Saeed RN  Outcome: Progressing     Problem: Pain  Goal: Verbalizes/displays adequate comfort level or baseline comfort level  6/7/2022 1136 by Richard Wells RN  Outcome: Completed  6/7/2022 0940 by Richard Wells RN  Outcome: Adequate for Discharge  6/7/2022 0043 by Ally Saeed RN  Outcome: Progressing     Problem: Safety - Adult  Goal: Free from fall injury  6/7/2022 1136 by Richard Wells RN  Outcome: Completed  6/7/2022 0940 by Richard Wells RN  Outcome: Progressing  6/7/2022 0043 by Ally Saeed RN  Outcome: Progressing     Problem: ABCDS Injury Assessment  Goal: Absence of physical injury  6/7/2022 1136 by Richard Wells RN  Outcome: Completed  6/7/2022 0940 by Richard Wells RN  Outcome: Progressing  6/7/2022 0043 by Ally Saeed RN  Outcome: Progressing

## 2022-06-07 NOTE — PROGRESS NOTES
Patient attempted to leave twice, this RN notified MD.  Dr. Melodie Carrillo came to see patient and was not ready for patient to be discharged. Patient decided to leave AMA. Paperwork signed and witness, MD aware. Notified charge nurse. Patient walked out independently without s/s of distress.

## 2022-06-07 NOTE — DISCHARGE SUMMARY
Hospital Medicine Discharge Summary    Patient: Jerilynn Castleman     Gender: female  : 1975   Age: 55 y.o. MRN: [id number]    Admitting Physician: Yves Wright MD  Discharge Physician: Jose Foss MD     Code Status: Prior     Admit Date: 2022   Discharge Date: 2022      Disposition:  AMA    Discharge Diagnoses: Active Hospital Problems    Diagnosis Date Noted    Seizure [facility]) [R56.9] 2022     Priority: Medium        1. Seizure disorder- alcohol related . -Currently on Keppra 500 mg twice daily  -Had an EEG but left before it was read .      2. History of alcohol abuse  -Was managed on [facility] protocol     3. Acute cystitis without hematuria   -Treated with IV rocephin       4. Alcoholic hepatitis/ Elevated LFTs/thrombocytopenia      5. Hypomagnesia- Replete as needed      Follow-up appointments: None as patient left AMA    Outpatient to do list: None    Condition at Discharge:  Stable    Hospital Course:   55 y.o. female with remote h/o seizures (1.5 years ago) and alcohol abuse  (vodka) abuse. She was admitted for seizures at home witnessed by the , once about 5:30 AM and another about 8:30 AM whilst lying in bed. She had no recollection of these episodes. The seizures lasted about 30 to 45 seconds. She has a history of seizures about year and a half ago when she was binge drinking vodka. She had been sober up until few months ago. She had some vodka few weeks ago and a beer last night. Not on any AED at [facility] was alert and oriented with no systemic symptoms. She was managed for alcohol withdrawal with CIWA's. She however opted to leave [address] despite explaining to her what the potential pitfalls of leaving [address] with a she was at risk of having seizures again and even dying as a complication of.   She was advised not to drive but claimed  was downstairs waiting for her and that he was the one going to drive alert\" been called? ->No Decision Support Exception - unselect if not a suspected or confirmed emergency medical condition->Emergency Medical Condition (MA) Is the patient pregnant?->No Reason for Exam: Convulsions with out a history of seizure FINDINGS: BRAIN/VENTRICLES: There is no acute intracranial hemorrhage, mass effect or midline shift. No abnormal extra-axial fluid collection. The gray-white differentiation is maintained without evidence of an acute infarct. There is no evidence of hydrocephalus. ORBITS: The visualized portion of the orbits demonstrate no acute abnormality. SINUSES: Mild mucosal thickening of the inferior maxillary sinuses, mild inflammatory disease. Mastoid air cells are clear. SOFT TISSUES/SKULL:  No acute abnormality of the visualized skull or soft tissues. No acute intracranial abnormality. CT ABDOMEN PELVIS W IV CONTRAST Additional Contrast? None    Result Date: 6/5/2022  EXAMINATION: CT OF THE ABDOMEN AND PELVIS WITH CONTRAST; CTA OF THE CHEST 6/5/2022 10:57 am TECHNIQUE: CT of the abdomen and pelvis was performed with the administration of intravenous contrast. Multiplanar reformatted images are provided for review. Automated exposure control, iterative reconstruction, and/or weight based adjustment of the mA/kV was utilized to reduce the radiation dose to as low as reasonably achievable.; CTA of the chest was performed after the administration of intravenous contrast.  Multiplanar reformatted images are provided for review. MIP images are provided for review. Automated exposure control, iterative reconstruction, and/or weight based adjustment of the mA/kV was utilized to reduce the radiation dose to as low as reasonably achievable.  COMPARISON: None HISTORY: ORDERING SYSTEM PROVIDED HISTORY: elevated lfts, seizures TECHNOLOGIST PROVIDED HISTORY: Reason for exam:->elevated lfts, seizures Additional Contrast?->None Decision Support Exception - unselect if not a suspected or confirmed emergency medical condition->Emergency Medical Condition (MA) Reason for Exam: elevated lfts, seizures tachy seziures; ORDERING SYSTEM PROVIDED HISTORY: tachycardia, seizures TECHNOLOGIST PROVIDED HISTORY: Reason for exam:->tachycardia, seizures Decision Support Exception - unselect if not a suspected or confirmed emergency medical condition->Emergency Medical Condition (MA) Reason for Exam: elevated lfts, seizures, tachy seizures FINDINGS: Chest: Mediastinum: The central airways are patent. There is no hilar or mediastinal adenopathy. The central pulmonary arterial system enhances normally without intraluminal filling defect. The main pulmonary artery is normal in caliber. The aorta, heart and pericardium are without acute abnormality. Note is made of a small hiatal hernia. Lungs/pleura: There is no consolidation, pneumothorax or effusion. Soft Tissues/Bones: There is no acute fracture or aggressive osseous lesion. Abdomen/Pelvis: Organs: The liver exhibits diffuse fatty infiltration. The pancreas, spleen, kidneys and adrenals are unremarkable. GI/Bowel: There is no bowel dilatation, wall thickening or obstruction. Pelvis: The uterus is surgically absent. The bladder is unremarkable. Peritoneum/Retroperitoneum: There is no free air, free fluid or intraperitoneal inflammatory change. There is no adenopathy. Bones/Soft Tissues: There is no acute fracture or aggressive osseous lesion. 1. Negative for pulmonary embolus. 2. Fatty infiltration of the liver. CT CHEST PULMONARY EMBOLISM W CONTRAST    Result Date: 6/5/2022  EXAMINATION: CT OF THE ABDOMEN AND PELVIS WITH CONTRAST; CTA OF THE CHEST 6/5/2022 10:57 am TECHNIQUE: CT of the abdomen and pelvis was performed with the administration of intravenous contrast. Multiplanar reformatted images are provided for review.  Automated exposure control, iterative reconstruction, and/or weight based adjustment of the mA/kV was utilized to reduce the radiation dose to as low as reasonably achievable.; CTA of the chest was performed after the administration of intravenous contrast.  Multiplanar reformatted images are provided for review. MIP images are provided for review. Automated exposure control, iterative reconstruction, and/or weight based adjustment of the mA/kV was utilized to reduce the radiation dose to as low as reasonably achievable. COMPARISON: None HISTORY: ORDERING SYSTEM PROVIDED HISTORY: elevated lfts, seizures TECHNOLOGIST PROVIDED HISTORY: Reason for exam:->elevated lfts, seizures Additional Contrast?->None Decision Support Exception - unselect if not a suspected or confirmed emergency medical condition->Emergency Medical Condition (MA) Reason for Exam: elevated lfts, seizures tachy seziures; ORDERING SYSTEM PROVIDED HISTORY: tachycardia, seizures TECHNOLOGIST PROVIDED HISTORY: Reason for exam:->tachycardia, seizures Decision Support Exception - unselect if not a suspected or confirmed emergency medical condition->Emergency Medical Condition (MA) Reason for Exam: elevated lfts, seizures, tachy seizures FINDINGS: Chest: Mediastinum: The central airways are patent. There is no hilar or mediastinal adenopathy. The central pulmonary arterial system enhances normally without intraluminal filling defect. The main pulmonary artery is normal in caliber. The aorta, heart and pericardium are without acute abnormality. Note is made of a small hiatal hernia. Lungs/pleura: There is no consolidation, pneumothorax or effusion. Soft Tissues/Bones: There is no acute fracture or aggressive osseous lesion. Abdomen/Pelvis: Organs: The liver exhibits diffuse fatty infiltration. The pancreas, spleen, kidneys and adrenals are unremarkable. GI/Bowel: There is no bowel dilatation, wall thickening or obstruction. Pelvis: The uterus is surgically absent. The bladder is unremarkable.  Peritoneum/Retroperitoneum: There is no free air, free fluid or intraperitoneal inflammatory change. There is no adenopathy. Bones/Soft Tissues: There is no acute fracture or aggressive osseous lesion. 1. Negative for pulmonary embolus. 2. Fatty infiltration of the liver. MRI BRAIN W WO CONTRAST    Result Date: 6/6/2022  EXAMINATION: MRI OF THE BRAIN WITHOUT AND WITH CONTRAST  6/6/2022 4:02 pm TECHNIQUE: Multiplanar multisequence MRI of the head/brain was performed without and with the administration of intravenous contrast. COMPARISON: None HISTORY: ORDERING SYSTEM PROVIDED HISTORY: new onset seizure, hx cancer TECHNOLOGIST PROVIDED HISTORY: Reason for exam:->new onset seizure, hx cancer Initial evaluation. FINDINGS: INTRACRANIAL STRUCTURES/VENTRICLES:  There is no acute infarct. The ventricles and cisternal spaces are prominent consistent with cerebral atrophy. Motion artifact mildly degrades the images. No definite areas of abnormal signal are identified in the visualized brain parenchyma. There is no midline shift or mass effect. There are no areas of restricted diffusion. No abnormal enhancement is identified in the brain parenchyma. There is no evidence for mesial temporal sclerosis. ORBITS: The visualized portion of the orbits demonstrate no acute abnormality. SINUSES: There is minimal mucoperiosteal thickening of the ethmoid air cells. The remainder of the sinuses are clear. The mastoid air cells are clear. BONES/SOFT TISSUES: The bone marrow signal intensity appears normal. The soft tissues demonstrate no acute abnormality. Motion artifact degrades the images. Mild cerebral atrophy. No acute brain parenchymal abnormality. There is no evidence for mesial temporal sclerosis. No abnormal areas of enhancement in the visualized brain parenchyma. RECOMMENDATIONS: Unavailable       EKG none        The patient was seen but not examined on day of discharge and this discharge summary is in conjunction with any daily progress note from day of discharge. Time Spent on discharge is 30 minutes  in the examination, evaluation, counseling and review of medications and discharge plan. Note that more than 30 minutes was spent in preparing discharge papers, discussing discharge with patient, medication review, etc.       Signed:    Elisabeth Trinh MD   6/7/2022      Thank you Jayne Pozo for the opportunity to be involved in this patient's care.  If you have any questions or concerns please feel free to contact me at [facility]

## 2022-06-07 NOTE — PROCEDURES
[address] [place] [address]                          ELECTROENCEPHALOGRAM REPORT    PATIENT NAME: Rossana Kitchen                 :        1975  MED REC NO:   [id number]                          ROOM:       0188  ACCOUNT NO:   [de-identified]                           ADMIT DATE: 2022  PROVIDER:     Juli Hall DO    DATE OF EE2022    REFERRING PROVIDER:  Sylwia Elmore NP    REASON FOR STUDY:  Seizure. BRIEF HISTORY AND NEUROLOGIC FINDINGS:  The patient is a 79-year-old  female being evaluated for reported seizure. MEDICATIONS:  The patient's centrally acting medications listed include  Keppra and Ativan. EEG FINDINGS:  This is a 20-channel digital EEG performed utilizing  bipolar and referential montages. Wakefulness, drowsiness and brief  sleep were obtained during the recording. During maximum wakefulness,  there was a low voltage, fairly symmetric, fairly well-organized and  reactive background consisting of predominantly beta frequencies. The  anterior background consists of low voltage fast frequencies as well. Drowsiness is manifested by attenuation of the waking background  rhythms. Brief sleep was manifested by sleep spindles and occasional  K-complexes. Photic stimulation was not performed due to technical factors. Hyperventilation exercise was not performed due to the patient's age  and/or clinical history. A 1-channel EKG rhythm strip was reviewed and showed no obvious cardiac  dysrhythmias. Significant myogenic artifact was present at times during the recording  which did obscure some of the underlying background rhythms. There was  also significant movement artifact though there was also severe artifact  in the left temporal region, again, frequently obscuring the underlying  background rhythms throughout essentially the entire study.     EEG DIAGNOSIS:  Essentially normal awake and asleep EEG with beta  activity. CLINICAL INTERPRETATION:  Beta activity is a nonspecific finding seen  most often in the setting of benzodiazepine and/or barbiturate usage. No definite epileptiform activity was present during this recording. This EEG is significantly limited secondary to artifact which rendered  certain parts of this EEG unreadable. If seizures remain a clinical  concern, then a repeat EEG may be of further benefit. Clinical  correlation is advised.         Trice Pozo DO    D: 06/07/2022 17:24:37       T: 06/07/2022 17:26:52     /[email or web address]  Job#: [id number]     Doc#: [id number]    CC:

## 2022-06-09 LAB
BLOOD CULTURE, ROUTINE: NORMAL
CULTURE, BLOOD 2: NORMAL

## 2025-07-08 ENCOUNTER — HOSPITAL ENCOUNTER (INPATIENT)
Age: 50
LOS: 2 days | Discharge: HOME | End: 2025-07-10
Payer: COMMERCIAL

## 2025-07-08 ENCOUNTER — APPOINTMENT (OUTPATIENT)
Dept: CT IMAGING | Age: 50
End: 2025-07-08
Payer: COMMERCIAL

## 2025-07-08 ENCOUNTER — APPOINTMENT (OUTPATIENT)
Dept: GENERAL RADIOLOGY | Age: 50
End: 2025-07-08
Payer: COMMERCIAL

## 2025-07-08 DIAGNOSIS — F10.939 ALCOHOL WITHDRAWAL SEIZURE WITH COMPLICATION (HCC): Primary | ICD-10-CM

## 2025-07-08 DIAGNOSIS — R74.8 ELEVATED LIVER ENZYMES: ICD-10-CM

## 2025-07-08 DIAGNOSIS — E87.20 LACTIC ACIDOSIS: ICD-10-CM

## 2025-07-08 DIAGNOSIS — R56.9 ALCOHOL WITHDRAWAL SEIZURE WITH COMPLICATION (HCC): Primary | ICD-10-CM

## 2025-07-08 DIAGNOSIS — E83.39 HYPOPHOSPHATEMIA: ICD-10-CM

## 2025-07-08 DIAGNOSIS — E87.6 HYPOKALEMIA: ICD-10-CM

## 2025-07-08 DIAGNOSIS — E87.3 METABOLIC ALKALOSIS: ICD-10-CM

## 2025-07-08 DIAGNOSIS — D69.6 THROMBOCYTOPENIA: ICD-10-CM

## 2025-07-08 DIAGNOSIS — E83.42 HYPOMAGNESEMIA: ICD-10-CM

## 2025-07-08 LAB
ALBUMIN SERPL-MCNC: 4.8 G/DL (ref 3.4–5)
ALBUMIN/GLOB SERPL: 1.3 {RATIO} (ref 1.1–2.2)
ALP SERPL-CCNC: 124 U/L (ref 40–129)
ALT SERPL-CCNC: 169 U/L (ref 10–40)
ANION GAP SERPL CALCULATED.3IONS-SCNC: 24 MMOL/L (ref 3–16)
APAP SERPL-MCNC: <5 UG/ML (ref 10–30)
AST SERPL-CCNC: 239 U/L (ref 15–37)
BASE EXCESS BLDV CALC-SCNC: 6.1 MMOL/L
BASOPHILS # BLD: 0 K/UL (ref 0–0.2)
BASOPHILS NFR BLD: 0.3 %
BILIRUB SERPL-MCNC: 1.5 MG/DL (ref 0–1)
BUN SERPL-MCNC: 10 MG/DL (ref 7–20)
CALCIUM SERPL-MCNC: 9.5 MG/DL (ref 8.3–10.6)
CHLORIDE SERPL-SCNC: 88 MMOL/L (ref 99–110)
CO2 BLDV-SCNC: 31 MMOL/L
CO2 SERPL-SCNC: 23 MMOL/L (ref 21–32)
COHGB MFR BLDV: 1.4 %
CREAT SERPL-MCNC: 0.6 MG/DL (ref 0.6–1.1)
DEPRECATED RDW RBC AUTO: 14.6 % (ref 12.4–15.4)
EOSINOPHIL # BLD: 0 K/UL (ref 0–0.6)
EOSINOPHIL NFR BLD: 0.4 %
ETHANOLAMINE SERPL-MCNC: NORMAL MG/DL (ref 0–0.08)
FOLATE SERPL-MCNC: 12.1 NG/ML (ref 4.78–24.2)
GFR SERPLBLD CREATININE-BSD FMLA CKD-EPI: >90 ML/MIN/{1.73_M2}
GLUCOSE SERPL-MCNC: 156 MG/DL (ref 70–99)
HCG SERPL QL: NEGATIVE
HCO3 BLDV-SCNC: 30 MMOL/L (ref 23–29)
HCT VFR BLD AUTO: 36.5 % (ref 36–48)
HGB BLD-MCNC: 12.9 G/DL (ref 12–16)
LACTATE BLDV-SCNC: 5.2 MMOL/L (ref 0.4–2)
LIPASE SERPL-CCNC: 26 U/L (ref 13–60)
LYMPHOCYTES # BLD: 0.8 K/UL (ref 1–5.1)
LYMPHOCYTES NFR BLD: 15.8 %
MAGNESIUM SERPL-MCNC: 1.12 MG/DL (ref 1.8–2.4)
MCH RBC QN AUTO: 35.1 PG (ref 26–34)
MCHC RBC AUTO-ENTMCNC: 35.4 G/DL (ref 31–36)
MCV RBC AUTO: 99.3 FL (ref 80–100)
METHGB MFR BLDV: 0.3 %
MONOCYTES # BLD: 0.5 K/UL (ref 0–1.3)
MONOCYTES NFR BLD: 10.4 %
NEUTROPHILS # BLD: 3.8 K/UL (ref 1.7–7.7)
NEUTROPHILS NFR BLD: 73.1 %
O2 THERAPY: ABNORMAL
PCO2 BLDV: 37.4 MMHG (ref 40–50)
PH BLDV: 7.51 [PH] (ref 7.35–7.45)
PHOSPHATE SERPL-MCNC: 0.9 MG/DL (ref 2.5–4.9)
PLATELET # BLD AUTO: 65 K/UL (ref 135–450)
PMV BLD AUTO: 8.8 FL (ref 5–10.5)
PO2 BLDV: 38 MMHG
POTASSIUM SERPL-SCNC: 2.9 MMOL/L (ref 3.5–5.1)
PROT SERPL-MCNC: 8.4 G/DL (ref 6.4–8.2)
RBC # BLD AUTO: 3.68 M/UL (ref 4–5.2)
SALICYLATES SERPL-MCNC: <0.5 MG/DL (ref 15–30)
SAO2 % BLDV: 72 %
SODIUM SERPL-SCNC: 135 MMOL/L (ref 136–145)
VIT B12 SERPL-MCNC: 1402 PG/ML (ref 211–911)
WBC # BLD AUTO: 5.2 K/UL (ref 4–11)

## 2025-07-08 PROCEDURE — 80179 DRUG ASSAY SALICYLATE: CPT

## 2025-07-08 PROCEDURE — 82077 ASSAY SPEC XCP UR&BREATH IA: CPT

## 2025-07-08 PROCEDURE — 80053 COMPREHEN METABOLIC PANEL: CPT

## 2025-07-08 PROCEDURE — 83735 ASSAY OF MAGNESIUM: CPT

## 2025-07-08 PROCEDURE — 70450 CT HEAD/BRAIN W/O DYE: CPT

## 2025-07-08 PROCEDURE — 2500000003 HC RX 250 WO HCPCS

## 2025-07-08 PROCEDURE — 93005 ELECTROCARDIOGRAM TRACING: CPT | Performed by: NURSE PRACTITIONER

## 2025-07-08 PROCEDURE — 96365 THER/PROPH/DIAG IV INF INIT: CPT

## 2025-07-08 PROCEDURE — 6370000000 HC RX 637 (ALT 250 FOR IP): Performed by: NURSE PRACTITIONER

## 2025-07-08 PROCEDURE — 82607 VITAMIN B-12: CPT

## 2025-07-08 PROCEDURE — 80143 DRUG ASSAY ACETAMINOPHEN: CPT

## 2025-07-08 PROCEDURE — 96375 TX/PRO/DX INJ NEW DRUG ADDON: CPT

## 2025-07-08 PROCEDURE — 81001 URINALYSIS AUTO W/SCOPE: CPT

## 2025-07-08 PROCEDURE — 82746 ASSAY OF FOLIC ACID SERUM: CPT

## 2025-07-08 PROCEDURE — 85025 COMPLETE CBC W/AUTO DIFF WBC: CPT

## 2025-07-08 PROCEDURE — 99285 EMERGENCY DEPT VISIT HI MDM: CPT

## 2025-07-08 PROCEDURE — 2580000003 HC RX 258: Performed by: NURSE PRACTITIONER

## 2025-07-08 PROCEDURE — 36415 COLL VENOUS BLD VENIPUNCTURE: CPT

## 2025-07-08 PROCEDURE — 6360000002 HC RX W HCPCS: Performed by: NURSE PRACTITIONER

## 2025-07-08 PROCEDURE — 82803 BLOOD GASES ANY COMBINATION: CPT

## 2025-07-08 PROCEDURE — 2500000003 HC RX 250 WO HCPCS: Performed by: NURSE PRACTITIONER

## 2025-07-08 PROCEDURE — 2000000000 HC ICU R&B

## 2025-07-08 PROCEDURE — 83605 ASSAY OF LACTIC ACID: CPT

## 2025-07-08 PROCEDURE — 83690 ASSAY OF LIPASE: CPT

## 2025-07-08 PROCEDURE — 84100 ASSAY OF PHOSPHORUS: CPT

## 2025-07-08 PROCEDURE — 71045 X-RAY EXAM CHEST 1 VIEW: CPT

## 2025-07-08 PROCEDURE — 96368 THER/DIAG CONCURRENT INF: CPT

## 2025-07-08 PROCEDURE — 87040 BLOOD CULTURE FOR BACTERIA: CPT

## 2025-07-08 PROCEDURE — 6370000000 HC RX 637 (ALT 250 FOR IP)

## 2025-07-08 PROCEDURE — 84703 CHORIONIC GONADOTROPIN ASSAY: CPT

## 2025-07-08 RX ORDER — ACETAMINOPHEN 325 MG/1
650 TABLET ORAL EVERY 6 HOURS PRN
Status: DISCONTINUED | OUTPATIENT
Start: 2025-07-08 | End: 2025-07-10 | Stop reason: HOSPADM

## 2025-07-08 RX ORDER — POLYETHYLENE GLYCOL 3350 17 G/17G
17 POWDER, FOR SOLUTION ORAL DAILY PRN
Status: DISCONTINUED | OUTPATIENT
Start: 2025-07-08 | End: 2025-07-10 | Stop reason: HOSPADM

## 2025-07-08 RX ORDER — IBUPROFEN 800 MG/1
800 TABLET, FILM COATED ORAL EVERY 6 HOURS PRN
Status: ON HOLD | COMMUNITY
End: 2025-07-10 | Stop reason: HOSPADM

## 2025-07-08 RX ORDER — ONDANSETRON 4 MG/1
4 TABLET, ORALLY DISINTEGRATING ORAL EVERY 8 HOURS PRN
Status: DISCONTINUED | OUTPATIENT
Start: 2025-07-08 | End: 2025-07-10 | Stop reason: HOSPADM

## 2025-07-08 RX ORDER — ONDANSETRON 2 MG/ML
4 INJECTION INTRAMUSCULAR; INTRAVENOUS ONCE
Status: COMPLETED | OUTPATIENT
Start: 2025-07-08 | End: 2025-07-08

## 2025-07-08 RX ORDER — PHENOBARBITAL 32.4 MG/1
32.4 TABLET ORAL 2 TIMES DAILY
Status: DISCONTINUED | OUTPATIENT
Start: 2025-07-10 | End: 2025-07-10 | Stop reason: HOSPADM

## 2025-07-08 RX ORDER — PHENOBARBITAL 32.4 MG/1
32.4 TABLET ORAL EVERY 6 HOURS PRN
Status: DISCONTINUED | OUTPATIENT
Start: 2025-07-09 | End: 2025-07-10 | Stop reason: HOSPADM

## 2025-07-08 RX ORDER — MULTIVITAMIN WITH IRON
1 TABLET ORAL ONCE
Status: COMPLETED | OUTPATIENT
Start: 2025-07-08 | End: 2025-07-08

## 2025-07-08 RX ORDER — CHLORDIAZEPOXIDE HYDROCHLORIDE 25 MG/1
50 CAPSULE, GELATIN COATED ORAL ONCE
Status: COMPLETED | OUTPATIENT
Start: 2025-07-08 | End: 2025-07-08

## 2025-07-08 RX ORDER — NALTREXONE HYDROCHLORIDE 50 MG/1
50 TABLET, FILM COATED ORAL DAILY
COMMUNITY

## 2025-07-08 RX ORDER — TRAZODONE HYDROCHLORIDE 50 MG/1
50 TABLET ORAL NIGHTLY
Status: ON HOLD | COMMUNITY
End: 2025-07-10

## 2025-07-08 RX ORDER — POTASSIUM CHLORIDE 1500 MG/1
40 TABLET, EXTENDED RELEASE ORAL PRN
Status: DISCONTINUED | OUTPATIENT
Start: 2025-07-08 | End: 2025-07-10 | Stop reason: HOSPADM

## 2025-07-08 RX ORDER — ENOXAPARIN SODIUM 100 MG/ML
40 INJECTION SUBCUTANEOUS DAILY
Status: DISCONTINUED | OUTPATIENT
Start: 2025-07-09 | End: 2025-07-10 | Stop reason: HOSPADM

## 2025-07-08 RX ORDER — SODIUM CHLORIDE 0.9 % (FLUSH) 0.9 %
5-40 SYRINGE (ML) INJECTION EVERY 12 HOURS SCHEDULED
Status: DISCONTINUED | OUTPATIENT
Start: 2025-07-08 | End: 2025-07-10 | Stop reason: HOSPADM

## 2025-07-08 RX ORDER — PHENOBARBITAL 32.4 MG/1
16.2 TABLET ORAL ONCE
Status: COMPLETED | OUTPATIENT
Start: 2025-07-08 | End: 2025-07-08

## 2025-07-08 RX ORDER — CHLORDIAZEPOXIDE HYDROCHLORIDE 25 MG/1
25 CAPSULE, GELATIN COATED ORAL 3 TIMES DAILY
Status: DISCONTINUED | OUTPATIENT
Start: 2025-07-08 | End: 2025-07-10 | Stop reason: HOSPADM

## 2025-07-08 RX ORDER — SODIUM CHLORIDE 9 MG/ML
INJECTION, SOLUTION INTRAVENOUS CONTINUOUS
Status: DISCONTINUED | OUTPATIENT
Start: 2025-07-08 | End: 2025-07-09

## 2025-07-08 RX ORDER — ONDANSETRON 2 MG/ML
4 INJECTION INTRAMUSCULAR; INTRAVENOUS EVERY 6 HOURS PRN
Status: DISCONTINUED | OUTPATIENT
Start: 2025-07-08 | End: 2025-07-10 | Stop reason: HOSPADM

## 2025-07-08 RX ORDER — GAUZE BANDAGE 2" X 2"
100 BANDAGE TOPICAL ONCE
Status: COMPLETED | OUTPATIENT
Start: 2025-07-08 | End: 2025-07-08

## 2025-07-08 RX ORDER — SODIUM CHLORIDE AND POTASSIUM CHLORIDE 150; 900 MG/100ML; MG/100ML
INJECTION, SOLUTION INTRAVENOUS CONTINUOUS
Status: DISPENSED | OUTPATIENT
Start: 2025-07-08 | End: 2025-07-09

## 2025-07-08 RX ORDER — FOLIC ACID 1 MG/1
1 TABLET ORAL ONCE
Status: COMPLETED | OUTPATIENT
Start: 2025-07-08 | End: 2025-07-08

## 2025-07-08 RX ORDER — MAGNESIUM SULFATE IN WATER 40 MG/ML
2000 INJECTION, SOLUTION INTRAVENOUS PRN
Status: DISCONTINUED | OUTPATIENT
Start: 2025-07-08 | End: 2025-07-10 | Stop reason: HOSPADM

## 2025-07-08 RX ORDER — PHENOBARBITAL 32.4 MG/1
64.8 TABLET ORAL EVERY 6 HOURS PRN
Status: ACTIVE | OUTPATIENT
Start: 2025-07-08 | End: 2025-07-09

## 2025-07-08 RX ORDER — 0.9 % SODIUM CHLORIDE 0.9 %
500 INTRAVENOUS SOLUTION INTRAVENOUS ONCE
Status: COMPLETED | OUTPATIENT
Start: 2025-07-08 | End: 2025-07-08

## 2025-07-08 RX ORDER — METHOCARBAMOL 750 MG/1
750 TABLET, FILM COATED ORAL 3 TIMES DAILY PRN
COMMUNITY

## 2025-07-08 RX ORDER — PHENOBARBITAL 32.4 MG/1
16.2 TABLET ORAL EVERY 6 HOURS PRN
Status: DISCONTINUED | OUTPATIENT
Start: 2025-07-11 | End: 2025-07-10 | Stop reason: HOSPADM

## 2025-07-08 RX ORDER — PHENOBARBITAL 32.4 MG/1
16.2 TABLET ORAL 2 TIMES DAILY
Status: DISCONTINUED | OUTPATIENT
Start: 2025-07-11 | End: 2025-07-10 | Stop reason: HOSPADM

## 2025-07-08 RX ORDER — ACETAMINOPHEN 650 MG/1
650 SUPPOSITORY RECTAL EVERY 6 HOURS PRN
Status: DISCONTINUED | OUTPATIENT
Start: 2025-07-08 | End: 2025-07-10 | Stop reason: HOSPADM

## 2025-07-08 RX ORDER — POTASSIUM CHLORIDE 7.45 MG/ML
10 INJECTION INTRAVENOUS PRN
Status: DISCONTINUED | OUTPATIENT
Start: 2025-07-08 | End: 2025-07-10 | Stop reason: HOSPADM

## 2025-07-08 RX ORDER — SODIUM CHLORIDE 9 MG/ML
INJECTION, SOLUTION INTRAVENOUS PRN
Status: DISCONTINUED | OUTPATIENT
Start: 2025-07-08 | End: 2025-07-10 | Stop reason: HOSPADM

## 2025-07-08 RX ORDER — PHENOBARBITAL 32.4 MG/1
32.4 TABLET ORAL 4 TIMES DAILY
Status: COMPLETED | OUTPATIENT
Start: 2025-07-09 | End: 2025-07-10

## 2025-07-08 RX ORDER — FOLIC ACID 1 MG/1
1 TABLET ORAL DAILY
Status: DISCONTINUED | OUTPATIENT
Start: 2025-07-09 | End: 2025-07-10 | Stop reason: HOSPADM

## 2025-07-08 RX ORDER — GAUZE BANDAGE 2" X 2"
100 BANDAGE TOPICAL DAILY
Status: DISCONTINUED | OUTPATIENT
Start: 2025-07-09 | End: 2025-07-10 | Stop reason: HOSPADM

## 2025-07-08 RX ORDER — PHENOBARBITAL 32.4 MG/1
64.8 TABLET ORAL 4 TIMES DAILY
Status: COMPLETED | OUTPATIENT
Start: 2025-07-08 | End: 2025-07-09

## 2025-07-08 RX ORDER — SODIUM CHLORIDE 0.9 % (FLUSH) 0.9 %
5-40 SYRINGE (ML) INJECTION PRN
Status: DISCONTINUED | OUTPATIENT
Start: 2025-07-08 | End: 2025-07-10 | Stop reason: HOSPADM

## 2025-07-08 RX ORDER — DIAZEPAM 10 MG/2ML
5 INJECTION, SOLUTION INTRAMUSCULAR; INTRAVENOUS
Status: DISCONTINUED | OUTPATIENT
Start: 2025-07-08 | End: 2025-07-08 | Stop reason: HOSPADM

## 2025-07-08 RX ORDER — LANOLIN ALCOHOL/MO/W.PET/CERES
400 CREAM (GRAM) TOPICAL 2 TIMES DAILY
COMMUNITY

## 2025-07-08 RX ORDER — MAGNESIUM SULFATE IN WATER 40 MG/ML
2000 INJECTION, SOLUTION INTRAVENOUS PRN
Status: DISCONTINUED | OUTPATIENT
Start: 2025-07-08 | End: 2025-07-08 | Stop reason: ALTCHOICE

## 2025-07-08 RX ADMIN — ONDANSETRON 4 MG: 2 INJECTION, SOLUTION INTRAMUSCULAR; INTRAVENOUS at 17:36

## 2025-07-08 RX ADMIN — Medication 100 MG: at 17:29

## 2025-07-08 RX ADMIN — CHLORDIAZEPOXIDE HYDROCHLORIDE 50 MG: 25 CAPSULE ORAL at 17:29

## 2025-07-08 RX ADMIN — THERA TABS 1 TABLET: TAB at 17:29

## 2025-07-08 RX ADMIN — PHENOBARBITAL 64.8 MG: 32.4 TABLET ORAL at 22:32

## 2025-07-08 RX ADMIN — SODIUM PHOSPHATE, MONOBASIC, MONOHYDRATE AND SODIUM PHOSPHATE, DIBASIC, ANHYDROUS 18.81 MMOL: 142; 276 INJECTION, SOLUTION INTRAVENOUS at 20:25

## 2025-07-08 RX ADMIN — POTASSIUM CHLORIDE AND SODIUM CHLORIDE: 900; 150 INJECTION, SOLUTION INTRAVENOUS at 19:04

## 2025-07-08 RX ADMIN — FOLIC ACID 1 MG: 1 TABLET ORAL at 17:29

## 2025-07-08 RX ADMIN — CHLORDIAZEPOXIDE HYDROCHLORIDE 25 MG: 25 CAPSULE ORAL at 22:31

## 2025-07-08 RX ADMIN — SODIUM CHLORIDE 500 ML: 9 INJECTION, SOLUTION INTRAVENOUS at 19:04

## 2025-07-08 RX ADMIN — PHENOBARBITAL 16.2 MG: 32.4 TABLET ORAL at 20:13

## 2025-07-08 RX ADMIN — MAGNESIUM SULFATE HEPTAHYDRATE 2000 MG: 40 INJECTION, SOLUTION INTRAVENOUS at 19:05

## 2025-07-08 RX ADMIN — SODIUM CHLORIDE, PRESERVATIVE FREE 10 ML: 5 INJECTION INTRAVENOUS at 22:33

## 2025-07-08 ASSESSMENT — ENCOUNTER SYMPTOMS
GASTROINTESTINAL NEGATIVE: 1
RESPIRATORY NEGATIVE: 1

## 2025-07-08 ASSESSMENT — PAIN - FUNCTIONAL ASSESSMENT: PAIN_FUNCTIONAL_ASSESSMENT: NONE - DENIES PAIN

## 2025-07-08 NOTE — ED TRIAGE NOTES
Pt brought in by [facility]. Pt was at dentist office. Staff left the room for a few minutes and came back to the patient acting very confused. Pt has history of seizures. Pt believes she had a seizure. Pt says she does not take any medication. Pt is oriented to person, place, and situation. Pt confused on year.

## 2025-07-08 NOTE — H&P
1. No acute intracranial findings. 2. Obstruction of the right ostiomeatal unit with complete opacification of the right maxillary sinus and near complete opacification of the right sides of the frontal and ethmoid sinuses.  Acute sinusitis is possible.  Mild mucosal thickening involves the sphenoid and left maxillary sinuses.     XR CHEST PORTABLE  Result Date: 7/8/2025  EXAMINATION: ONE XRAY VIEW OF THE CHEST 7/8/2025 4:40 pm COMPARISON: None. HISTORY: ORDERING SYSTEM PROVIDED HISTORY: medical workup TECHNOLOGIST PROVIDED HISTORY: Reason for exam:->medical workup Reason for Exam: medical workup FINDINGS: Normal cardiomediastinal silhouette.  No pneumothorax or pleural effusion. No acute infiltrates     No acute cardiopulmonary findings         Electronically signed by Marjorie Tracy MD on 7/8/2025 at 7:48 PM

## 2025-07-08 NOTE — ED PROVIDER NOTES
visualized and preliminarilyinterpreted by the emergency physician with the below findings:    Interpretation per the Radiologist below,if available at the time of this note:    CT HEAD WO CONTRAST   Final Result   1. No acute intracranial findings.   2. Obstruction of the right ostiomeatal unit with complete opacification of   the right maxillary sinus and near complete opacification of the right sides   of the frontal and ethmoid sinuses.  Acute sinusitis is possible.  Mild   mucosal thickening involves the sphenoid and left maxillary sinuses.         XR CHEST PORTABLE   Final Result   No acute cardiopulmonary findings               LABS:  Labs Reviewed   CBC WITH AUTO DIFFERENTIAL - Abnormal; Notable for the following components:       Result Value    RBC 3.68 (*)     MCH 35.1 (*)     Platelets 65 (*)     Lymphocytes Absolute 0.8 (*)     All other components within normal limits   COMPREHENSIVE METABOLIC PANEL - Abnormal; Notable for the following components:    Sodium 135 (*)     Potassium 2.9 (*)     Chloride 88 (*)     Anion Gap 24 (*)     Glucose 156 (*)     Total Protein 8.4 (*)     Total Bilirubin 1.5 (*)      (*)      (*)     All other components within normal limits    Narrative:     CALL  [employer] tel. [phone],  Chemistry results called to and read back by Mindy Singh RN, 07/08/2025  16:58, by Zerista   LACTIC ACID - Abnormal; Notable for the following components:    Lactic Acid 5.2 (*)     All other components within normal limits    Narrative:     CALL  [employer] tel. [phone],  Chemistry results called to and read back by Mindy Singh RN, 07/08/2025  16:57, by Zerista   ACETAMINOPHEN LEVEL - Abnormal; Notable for the following components:    Acetaminophen Level <5 (*)     All other components within normal limits    Narrative:     CALL  [employer] tel. [phone],  Chemistry results called to and read back by Mindy Singh RN, 07/08/2025  18:24, by Zerista   BLOOD GAS,

## 2025-07-09 LAB
ALBUMIN SERPL-MCNC: 4.2 G/DL (ref 3.4–5)
ALBUMIN/GLOB SERPL: 1.4 {RATIO} (ref 1.1–2.2)
ALP SERPL-CCNC: 106 U/L (ref 40–129)
ALT SERPL-CCNC: 124 U/L (ref 10–40)
ANION GAP SERPL CALCULATED.3IONS-SCNC: 14 MMOL/L (ref 3–16)
ANION GAP SERPL CALCULATED.3IONS-SCNC: 16 MMOL/L (ref 3–16)
ANION GAP SERPL CALCULATED.3IONS-SCNC: 18 MMOL/L (ref 3–16)
AST SERPL-CCNC: 164 U/L (ref 15–37)
BACTERIA URNS QL MICRO: NORMAL /HPF
BASOPHILS # BLD: 0 K/UL (ref 0–0.2)
BASOPHILS NFR BLD: 0.2 %
BILIRUB SERPL-MCNC: 1 MG/DL (ref 0–1)
BILIRUB UR QL STRIP.AUTO: NEGATIVE
BUN SERPL-MCNC: 4 MG/DL (ref 7–20)
BUN SERPL-MCNC: 6 MG/DL (ref 7–20)
BUN SERPL-MCNC: 9 MG/DL (ref 7–20)
CALCIUM SERPL-MCNC: 8.3 MG/DL (ref 8.3–10.6)
CALCIUM SERPL-MCNC: 8.5 MG/DL (ref 8.3–10.6)
CALCIUM SERPL-MCNC: 8.6 MG/DL (ref 8.3–10.6)
CHLORIDE SERPL-SCNC: 93 MMOL/L (ref 99–110)
CHLORIDE SERPL-SCNC: 96 MMOL/L (ref 99–110)
CHLORIDE SERPL-SCNC: 98 MMOL/L (ref 99–110)
CLARITY UR: CLEAR
CO2 SERPL-SCNC: 22 MMOL/L (ref 21–32)
CO2 SERPL-SCNC: 25 MMOL/L (ref 21–32)
CO2 SERPL-SCNC: 26 MMOL/L (ref 21–32)
COLOR UR: YELLOW
CREAT SERPL-MCNC: 0.4 MG/DL (ref 0.6–1.1)
CREAT SERPL-MCNC: 0.5 MG/DL (ref 0.6–1.1)
CREAT SERPL-MCNC: 0.6 MG/DL (ref 0.6–1.1)
DEPRECATED RDW RBC AUTO: 14.6 % (ref 12.4–15.4)
EKG ATRIAL RATE: 95 BPM
EKG DIAGNOSIS: NORMAL
EKG P AXIS: 10 DEGREES
EKG P-R INTERVAL: 118 MS
EKG Q-T INTERVAL: 388 MS
EKG QRS DURATION: 76 MS
EKG QTC CALCULATION (BAZETT): 487 MS
EKG R AXIS: 5 DEGREES
EKG T AXIS: 10 DEGREES
EKG VENTRICULAR RATE: 95 BPM
EOSINOPHIL # BLD: 0 K/UL (ref 0–0.6)
EOSINOPHIL NFR BLD: 0.4 %
EPI CELLS #/AREA URNS AUTO: 4 /HPF (ref 0–5)
GFR SERPLBLD CREATININE-BSD FMLA CKD-EPI: >90 ML/MIN/{1.73_M2}
GLUCOSE SERPL-MCNC: 81 MG/DL (ref 70–99)
GLUCOSE SERPL-MCNC: 88 MG/DL (ref 70–99)
GLUCOSE SERPL-MCNC: 91 MG/DL (ref 70–99)
GLUCOSE UR STRIP.AUTO-MCNC: NEGATIVE MG/DL
HCT VFR BLD AUTO: 33.9 % (ref 36–48)
HGB BLD-MCNC: 12.1 G/DL (ref 12–16)
HGB UR QL STRIP.AUTO: NEGATIVE
HYALINE CASTS #/AREA URNS AUTO: 1 /LPF (ref 0–8)
KETONES UR STRIP.AUTO-MCNC: >=80 MG/DL
LACTATE BLDV-SCNC: 0.8 MMOL/L (ref 0.4–2)
LEUKOCYTE ESTERASE UR QL STRIP.AUTO: NEGATIVE
LYMPHOCYTES # BLD: 1 K/UL (ref 1–5.1)
LYMPHOCYTES NFR BLD: 23.6 %
MAGNESIUM SERPL-MCNC: 1.62 MG/DL (ref 1.8–2.4)
MAGNESIUM SERPL-MCNC: 1.78 MG/DL (ref 1.8–2.4)
MAGNESIUM SERPL-MCNC: 1.95 MG/DL (ref 1.8–2.4)
MCH RBC QN AUTO: 35.1 PG (ref 26–34)
MCHC RBC AUTO-ENTMCNC: 35.6 G/DL (ref 31–36)
MCV RBC AUTO: 98.6 FL (ref 80–100)
MONOCYTES # BLD: 0.5 K/UL (ref 0–1.3)
MONOCYTES NFR BLD: 11.7 %
NEUTROPHILS # BLD: 2.7 K/UL (ref 1.7–7.7)
NEUTROPHILS NFR BLD: 64.1 %
NITRITE UR QL STRIP.AUTO: NEGATIVE
PH UR STRIP.AUTO: 8 [PH] (ref 5–8)
PHOSPHATE SERPL-MCNC: 2 MG/DL (ref 2.5–4.9)
PHOSPHATE SERPL-MCNC: 2.7 MG/DL (ref 2.5–4.9)
PHOSPHATE SERPL-MCNC: 3 MG/DL (ref 2.5–4.9)
PLATELET # BLD AUTO: 56 K/UL (ref 135–450)
PMV BLD AUTO: 9.7 FL (ref 5–10.5)
POTASSIUM SERPL-SCNC: 2.5 MMOL/L (ref 3.5–5.1)
POTASSIUM SERPL-SCNC: 3 MMOL/L (ref 3.5–5.1)
POTASSIUM SERPL-SCNC: 3.4 MMOL/L (ref 3.5–5.1)
PROT SERPL-MCNC: 7.2 G/DL (ref 6.4–8.2)
PROT UR STRIP.AUTO-MCNC: 100 MG/DL
RBC # BLD AUTO: 3.44 M/UL (ref 4–5.2)
RBC CLUMPS #/AREA URNS AUTO: 2 /HPF (ref 0–4)
SODIUM SERPL-SCNC: 135 MMOL/L (ref 136–145)
SODIUM SERPL-SCNC: 136 MMOL/L (ref 136–145)
SODIUM SERPL-SCNC: 137 MMOL/L (ref 136–145)
SP GR UR STRIP.AUTO: 1.02 (ref 1–1.03)
UA COMPLETE W REFLEX CULTURE PNL UR: ABNORMAL
UA DIPSTICK W REFLEX MICRO PNL UR: YES
URN SPEC COLLECT METH UR: ABNORMAL
UROBILINOGEN UR STRIP-ACNC: 2 E.U./DL
WBC # BLD AUTO: 4.3 K/UL (ref 4–11)
WBC #/AREA URNS AUTO: 1 /HPF (ref 0–5)

## 2025-07-09 PROCEDURE — 2500000003 HC RX 250 WO HCPCS

## 2025-07-09 PROCEDURE — 80074 ACUTE HEPATITIS PANEL: CPT

## 2025-07-09 PROCEDURE — 6360000002 HC RX W HCPCS

## 2025-07-09 PROCEDURE — 1200000000 HC SEMI PRIVATE

## 2025-07-09 PROCEDURE — 6370000000 HC RX 637 (ALT 250 FOR IP)

## 2025-07-09 PROCEDURE — 94760 N-INVAS EAR/PLS OXIMETRY 1: CPT

## 2025-07-09 PROCEDURE — 83605 ASSAY OF LACTIC ACID: CPT

## 2025-07-09 PROCEDURE — 36415 COLL VENOUS BLD VENIPUNCTURE: CPT

## 2025-07-09 PROCEDURE — 2580000003 HC RX 258: Performed by: NURSE PRACTITIONER

## 2025-07-09 PROCEDURE — 83735 ASSAY OF MAGNESIUM: CPT

## 2025-07-09 PROCEDURE — 84100 ASSAY OF PHOSPHORUS: CPT

## 2025-07-09 PROCEDURE — 2500000003 HC RX 250 WO HCPCS: Performed by: NURSE PRACTITIONER

## 2025-07-09 PROCEDURE — 93010 ELECTROCARDIOGRAM REPORT: CPT | Performed by: INTERNAL MEDICINE

## 2025-07-09 PROCEDURE — 6370000000 HC RX 637 (ALT 250 FOR IP): Performed by: HOSPITALIST

## 2025-07-09 PROCEDURE — 80053 COMPREHEN METABOLIC PANEL: CPT

## 2025-07-09 PROCEDURE — 85025 COMPLETE CBC W/AUTO DIFF WBC: CPT

## 2025-07-09 PROCEDURE — 2580000003 HC RX 258

## 2025-07-09 RX ORDER — TRAZODONE HYDROCHLORIDE 50 MG/1
50 TABLET ORAL NIGHTLY PRN
Status: DISCONTINUED | OUTPATIENT
Start: 2025-07-09 | End: 2025-07-10 | Stop reason: HOSPADM

## 2025-07-09 RX ORDER — ESCITALOPRAM OXALATE 10 MG/1
5 TABLET ORAL DAILY
Status: DISCONTINUED | OUTPATIENT
Start: 2025-07-09 | End: 2025-07-10 | Stop reason: HOSPADM

## 2025-07-09 RX ADMIN — SODIUM CHLORIDE, PRESERVATIVE FREE 10 ML: 5 INJECTION INTRAVENOUS at 20:01

## 2025-07-09 RX ADMIN — PHENOBARBITAL 64.8 MG: 32.4 TABLET ORAL at 13:17

## 2025-07-09 RX ADMIN — CHLORDIAZEPOXIDE HYDROCHLORIDE 25 MG: 25 CAPSULE ORAL at 08:42

## 2025-07-09 RX ADMIN — POTASSIUM CHLORIDE 10 MEQ: 7.46 INJECTION, SOLUTION INTRAVENOUS at 18:36

## 2025-07-09 RX ADMIN — POTASSIUM CHLORIDE 10 MEQ: 7.46 INJECTION, SOLUTION INTRAVENOUS at 04:54

## 2025-07-09 RX ADMIN — PHENOBARBITAL 64.8 MG: 32.4 TABLET ORAL at 08:42

## 2025-07-09 RX ADMIN — PHENOBARBITAL 64.8 MG: 32.4 TABLET ORAL at 17:31

## 2025-07-09 RX ADMIN — FOLIC ACID 1 MG: 1 TABLET ORAL at 08:42

## 2025-07-09 RX ADMIN — CHLORDIAZEPOXIDE HYDROCHLORIDE 25 MG: 25 CAPSULE ORAL at 13:17

## 2025-07-09 RX ADMIN — POTASSIUM CHLORIDE 10 MEQ: 7.46 INJECTION, SOLUTION INTRAVENOUS at 23:33

## 2025-07-09 RX ADMIN — POTASSIUM CHLORIDE 10 MEQ: 7.46 INJECTION, SOLUTION INTRAVENOUS at 05:58

## 2025-07-09 RX ADMIN — PHENOBARBITAL 32.4 MG: 32.4 TABLET ORAL at 21:16

## 2025-07-09 RX ADMIN — POTASSIUM CHLORIDE 10 MEQ: 7.46 INJECTION, SOLUTION INTRAVENOUS at 03:36

## 2025-07-09 RX ADMIN — SODIUM CHLORIDE: 0.9 INJECTION, SOLUTION INTRAVENOUS at 04:55

## 2025-07-09 RX ADMIN — SODIUM PHOSPHATE, MONOBASIC, MONOHYDRATE AND SODIUM PHOSPHATE, DIBASIC, ANHYDROUS 9.42 MMOL: 142; 276 INJECTION, SOLUTION INTRAVENOUS at 10:03

## 2025-07-09 RX ADMIN — POTASSIUM CHLORIDE 10 MEQ: 7.46 INJECTION, SOLUTION INTRAVENOUS at 17:33

## 2025-07-09 RX ADMIN — POTASSIUM CHLORIDE 10 MEQ: 7.46 INJECTION, SOLUTION INTRAVENOUS at 16:28

## 2025-07-09 RX ADMIN — POTASSIUM CHLORIDE 40 MEQ: 1500 TABLET, EXTENDED RELEASE ORAL at 16:27

## 2025-07-09 RX ADMIN — POTASSIUM CHLORIDE 10 MEQ: 7.46 INJECTION, SOLUTION INTRAVENOUS at 01:01

## 2025-07-09 RX ADMIN — ESCITALOPRAM OXALATE 5 MG: 10 TABLET ORAL at 17:31

## 2025-07-09 RX ADMIN — POTASSIUM BICARBONATE 40 MEQ: 391 TABLET, EFFERVESCENT ORAL at 01:01

## 2025-07-09 RX ADMIN — Medication 100 MG: at 08:42

## 2025-07-09 RX ADMIN — CHLORDIAZEPOXIDE HYDROCHLORIDE 25 MG: 25 CAPSULE ORAL at 21:16

## 2025-07-09 RX ADMIN — POTASSIUM CHLORIDE 10 MEQ: 7.46 INJECTION, SOLUTION INTRAVENOUS at 07:23

## 2025-07-09 RX ADMIN — MAGNESIUM SULFATE HEPTAHYDRATE 2000 MG: 40 INJECTION, SOLUTION INTRAVENOUS at 16:26

## 2025-07-09 RX ADMIN — POTASSIUM CHLORIDE 10 MEQ: 7.46 INJECTION, SOLUTION INTRAVENOUS at 02:13

## 2025-07-09 ASSESSMENT — PAIN SCALES - GENERAL
PAINLEVEL_OUTOF10: 0
PAINLEVEL_OUTOF10: 0

## 2025-07-09 NOTE — CONSULTS
atrophy.  No acute brain  parenchymal abnormality.  There is no evidence for mesial temporal sclerosis.  No abnormal areas of enhancement in the visualized brain parenchyma.    EEG 2022  EEG DIAGNOSIS:  Essentially normal awake and asleep EEG with beta  activity.    Impression/Recommendations  Seizure.   Lactic acidosis secondary to seizure.    Electrolyte derangements.     The patient experienced a seizure at the dentist yesterday.  Currently she is back to baseline.  This is her 3rd seizure in her lifetime, the previous two thought to be in relation to her ETOH use and in the setting of a UTI.     This doesn't appear to be a withdrawal event.  Perhaps her metabolic derangements could have been enough to lower her seizure threshold.      Brain MRI and EEG in 2022 were unremarkable.     Will repeat those studies which have been ordered.      At this point I don't think there is a strong indication to start an anti epileptic medication.      Discussed seizure precautions including no driving for 3 months w/out any further seizures.      Zackery Joseph NP  [facility]    A copy of this note was provided for Dr Bains, Nicholas Torres MD

## 2025-07-09 NOTE — PLAN OF CARE
Problem: Discharge Planning  Goal: Discharge to home or other facility with appropriate resources  7/9/2025 1106 by Susie Jacobs, RN  Outcome: Progressing  Flowsheets (Taken 7/9/2025 1106)  Discharge to home or other facility with appropriate resources:   Identify barriers to discharge with patient and caregiver   Arrange for needed discharge resources and transportation as appropriate     Problem: Seizure Precautions  Goal: Remains free of injury related to seizures activity  7/9/2025 1106 by Susie Jacobs, RN  Outcome: Progressing  Flowsheets (Taken 7/9/2025 1106)  Remains free of injury related to seizure activity:   Maintain airway, patient safety  and administer oxygen as ordered   Monitor patient for seizure activity, document and report duration and description of seizure to Licensed Independent Practitioner   If seizure occurs, turn patient to side and suction secretions as needed   Reorient patient post seizure     Problem: Safety - Adult  Goal: Free from fall injury  7/9/2025 1106 by Susie Jacobs, RN  Outcome: Progressing  Flowsheets (Taken 7/9/2025 1106)  Free From Fall Injury: Instruct family/caregiver on patient safety
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
## 2025-07-09 NOTE — PLAN OF CARE
Problem: Discharge Planning  Goal: Discharge to home or other facility with appropriate resources  Outcome: Progressing     Problem: Seizure Precautions  Goal: Remains free of injury related to seizures activity  Outcome: Progressing     Problem: Safety - Adult  Goal: Free from fall injury  Outcome: Progressing

## 2025-07-09 NOTE — CARE COORDINATION
Discharge Planning:     SW consult noted for ETOH rehab. Judi Angel on-site to meet with patient and setup ETOH rehab if patient is in agreement.     Randall FITZPATRICK RN  Case Management  [phone]    Electronically signed by Randall Hanna RN on 7/9/2025 at 12:31 PM

## 2025-07-10 ENCOUNTER — APPOINTMENT (OUTPATIENT)
Dept: MRI IMAGING | Age: 50
End: 2025-07-10
Payer: COMMERCIAL

## 2025-07-10 VITALS
HEART RATE: 86 BPM | DIASTOLIC BLOOD PRESSURE: 78 MMHG | OXYGEN SATURATION: 99 % | HEIGHT: 62 IN | WEIGHT: 121.03 LBS | SYSTOLIC BLOOD PRESSURE: 116 MMHG | TEMPERATURE: 97.3 F | RESPIRATION RATE: 16 BRPM | BODY MASS INDEX: 22.27 KG/M2

## 2025-07-10 LAB
ALBUMIN SERPL-MCNC: 4.4 G/DL (ref 3.4–5)
ALBUMIN/GLOB SERPL: 1.3 {RATIO} (ref 1.1–2.2)
ALP SERPL-CCNC: 122 U/L (ref 40–129)
ALT SERPL-CCNC: 137 U/L (ref 10–40)
ANION GAP SERPL CALCULATED.3IONS-SCNC: 16 MMOL/L (ref 3–16)
AST SERPL-CCNC: 174 U/L (ref 15–37)
BASOPHILS # BLD: 0 K/UL (ref 0–0.2)
BASOPHILS NFR BLD: 0.4 %
BILIRUB SERPL-MCNC: 1 MG/DL (ref 0–1)
BUN SERPL-MCNC: 5 MG/DL (ref 7–20)
CALCIUM SERPL-MCNC: 9 MG/DL (ref 8.3–10.6)
CHLORIDE SERPL-SCNC: 95 MMOL/L (ref 99–110)
CO2 SERPL-SCNC: 21 MMOL/L (ref 21–32)
CREAT SERPL-MCNC: 0.5 MG/DL (ref 0.6–1.1)
DEPRECATED RDW RBC AUTO: 14.2 % (ref 12.4–15.4)
EOSINOPHIL # BLD: 0.1 K/UL (ref 0–0.6)
EOSINOPHIL NFR BLD: 1.6 %
GFR SERPLBLD CREATININE-BSD FMLA CKD-EPI: >90 ML/MIN/{1.73_M2}
GLUCOSE SERPL-MCNC: 89 MG/DL (ref 70–99)
HAV IGM SERPL QL IA: NORMAL
HBV CORE IGM SERPL QL IA: NORMAL
HBV SURFACE AG SERPL QL IA: NORMAL
HCT VFR BLD AUTO: 38.6 % (ref 36–48)
HCV AB SERPL QL IA: NORMAL
HGB BLD-MCNC: 13.6 G/DL (ref 12–16)
LYMPHOCYTES # BLD: 1 K/UL (ref 1–5.1)
LYMPHOCYTES NFR BLD: 21.7 %
MAGNESIUM SERPL-MCNC: 1.85 MG/DL (ref 1.8–2.4)
MCH RBC QN AUTO: 35 PG (ref 26–34)
MCHC RBC AUTO-ENTMCNC: 35.2 G/DL (ref 31–36)
MCV RBC AUTO: 99.5 FL (ref 80–100)
MONOCYTES # BLD: 0.4 K/UL (ref 0–1.3)
MONOCYTES NFR BLD: 9.9 %
NEUTROPHILS # BLD: 3 K/UL (ref 1.7–7.7)
NEUTROPHILS NFR BLD: 66.4 %
PHOSPHATE SERPL-MCNC: 2.2 MG/DL (ref 2.5–4.9)
PLATELET # BLD AUTO: 71 K/UL (ref 135–450)
PMV BLD AUTO: 9.5 FL (ref 5–10.5)
POTASSIUM SERPL-SCNC: 3.4 MMOL/L (ref 3.5–5.1)
PROT SERPL-MCNC: 7.8 G/DL (ref 6.4–8.2)
RBC # BLD AUTO: 3.88 M/UL (ref 4–5.2)
SODIUM SERPL-SCNC: 132 MMOL/L (ref 136–145)
WBC # BLD AUTO: 4.5 K/UL (ref 4–11)

## 2025-07-10 PROCEDURE — 85025 COMPLETE CBC W/AUTO DIFF WBC: CPT

## 2025-07-10 PROCEDURE — 36415 COLL VENOUS BLD VENIPUNCTURE: CPT

## 2025-07-10 PROCEDURE — 6360000004 HC RX CONTRAST MEDICATION: Performed by: NURSE PRACTITIONER

## 2025-07-10 PROCEDURE — 70553 MRI BRAIN STEM W/O & W/DYE: CPT

## 2025-07-10 PROCEDURE — 83735 ASSAY OF MAGNESIUM: CPT

## 2025-07-10 PROCEDURE — 84100 ASSAY OF PHOSPHORUS: CPT

## 2025-07-10 PROCEDURE — 95819 EEG AWAKE AND ASLEEP: CPT

## 2025-07-10 PROCEDURE — A9579 GAD-BASE MR CONTRAST NOS,1ML: HCPCS | Performed by: NURSE PRACTITIONER

## 2025-07-10 PROCEDURE — 2500000003 HC RX 250 WO HCPCS

## 2025-07-10 PROCEDURE — 94760 N-INVAS EAR/PLS OXIMETRY 1: CPT

## 2025-07-10 PROCEDURE — 6370000000 HC RX 637 (ALT 250 FOR IP): Performed by: HOSPITALIST

## 2025-07-10 PROCEDURE — 6370000000 HC RX 637 (ALT 250 FOR IP)

## 2025-07-10 PROCEDURE — 80053 COMPREHEN METABOLIC PANEL: CPT

## 2025-07-10 RX ORDER — TRAZODONE HYDROCHLORIDE 50 MG/1
50 TABLET ORAL NIGHTLY PRN
COMMUNITY
Start: 2025-07-10

## 2025-07-10 RX ORDER — GADOTERIDOL 279.3 MG/ML
10 INJECTION INTRAVENOUS
Status: COMPLETED | OUTPATIENT
Start: 2025-07-10 | End: 2025-07-10

## 2025-07-10 RX ORDER — FOLIC ACID 1 MG/1
1 TABLET ORAL DAILY
Qty: 30 TABLET | Refills: 3 | Status: SHIPPED | OUTPATIENT
Start: 2025-07-11

## 2025-07-10 RX ORDER — ESCITALOPRAM OXALATE 5 MG/1
5 TABLET ORAL DAILY
Qty: 30 TABLET | Refills: 3 | Status: SHIPPED | OUTPATIENT
Start: 2025-07-11

## 2025-07-10 RX ORDER — THIAMINE MONONITRATE (VIT B1) 100 MG
100 TABLET ORAL DAILY
Qty: 30 TABLET | Refills: 2 | Status: SHIPPED | OUTPATIENT
Start: 2025-07-11

## 2025-07-10 RX ADMIN — Medication 250 MG: at 13:22

## 2025-07-10 RX ADMIN — CHLORDIAZEPOXIDE HYDROCHLORIDE 25 MG: 25 CAPSULE ORAL at 08:20

## 2025-07-10 RX ADMIN — GADOTERIDOL 10 ML: 279.3 INJECTION, SOLUTION INTRAVENOUS at 16:32

## 2025-07-10 RX ADMIN — PHENOBARBITAL 32.4 MG: 32.4 TABLET ORAL at 13:23

## 2025-07-10 RX ADMIN — Medication 250 MG: at 17:03

## 2025-07-10 RX ADMIN — Medication 100 MG: at 08:21

## 2025-07-10 RX ADMIN — CHLORDIAZEPOXIDE HYDROCHLORIDE 25 MG: 25 CAPSULE ORAL at 13:22

## 2025-07-10 RX ADMIN — FOLIC ACID 1 MG: 1 TABLET ORAL at 08:21

## 2025-07-10 RX ADMIN — SODIUM CHLORIDE, PRESERVATIVE FREE 10 ML: 5 INJECTION INTRAVENOUS at 08:22

## 2025-07-10 RX ADMIN — ESCITALOPRAM OXALATE 5 MG: 10 TABLET ORAL at 08:20

## 2025-07-10 RX ADMIN — PHENOBARBITAL 32.4 MG: 32.4 TABLET ORAL at 08:21

## 2025-07-10 RX ADMIN — PHENOBARBITAL 32.4 MG: 32.4 TABLET ORAL at 17:03

## 2025-07-10 NOTE — PROGRESS NOTES
V2.0    [facility] Progress Note      Name:  Jo Mcgee /Age/Sex: 1975  (49 y.o. female)   MRN & CSN:  [id number] & [id number] Encounter Date/Time: 2025 8:57 AM EDT   Location:  [id number] PCP: Denise Pham PA-C     Attending:Nicholas Bains MD       Hospital Day: 2      HPI :       Subjective:     Patient states she feels a lot better after getting fluids and electrolyte    Review of Systems:      Pertinent positives and negatives discussed in HPI    Objective:     Intake/Output Summary (Last 24 hours) at 2025 0817  Last data filed at 2025 0600  Gross per 24 hour   Intake 1393.42 ml   Output 1800 ml   Net -406.58 ml      Vitals:   Vitals:    25 0412 25 0500 25 0600 25 0758   BP: (!) 130/91 (!) 128/96 125/88    Pulse: 85 91 82 86   Resp: 16   19   Temp: 98.6 °F (37 °C)      TempSrc: Oral      SpO2: 98%   96%   Weight:  54.9 kg (121 lb 0.5 oz)     Height:  1.575 m (5' 2\")           Physical Exam:      Physical Exam Performed:    /88   Pulse 86   Temp 98.6 °F (37 °C) (Oral)   Resp 19   Ht 1.575 m (5' 2\")   Wt 54.9 kg (121 lb 0.5 oz)   SpO2 96%   BMI 22.14 kg/m²     General appearance: No apparent distress, appears stated age and cooperative.  HEENT: Pupils equal, round, and reactive to light. Conjunctivae/corneas clear.  Neck: Supple, with full range of motion. No jugular venous distention. Trachea midline.  Respiratory:  Normal respiratory effort. Clear to auscultation, bilaterally without Rales/Wheezes/Rhonchi.  Cardiovascular: Regular rate and rhythm with normal S1/S2 without murmurs, rubs or gallops.  Abdomen: Soft, non-tender, non-distended with normal bowel sounds.  Musculoskeletal: No clubbing, cyanosis or edema bilaterally.  Full range of motion without deformity.  Neurologic:  Neurovascularly intact without any focal sensory/motor deficits. Cranial nerves: II-XII intact, grossly non-focal.  Psychiatric: Alert and oriented, thought content 
4 Eyes Skin Assessment     NAME:  Jo Mcgee  YOB: 1975  MEDICAL RECORD NUMBER:  [id number]    The patient is being assessed for  Admission    I agree that at least one RN has performed a thorough Head to Toe Skin Assessment on the patient. ALL assessment sites listed below have been assessed.      Areas assessed by both nurses:    Head, Face, Ears, Shoulders, Back, Chest, Arms, Elbows, Hands, Sacrum. Buttock, Coccyx, Ischium, Legs. Feet and Heels, Under Medical Devices , and Other          Does the Patient have a Wound? No noted wound(s)       Tenzin Prevention initiated by RN: Yes  Wound Care Orders initiated by RN: No    Pressure Injury (Stage 1,2,3,4, Unstageable, DTI, NWPT, and Complex wounds) if present, place Wound referral order by RN under : No    New Ostomies, if present place, Ostomy referral order under : No     Nurse 1 eSignature: Electronically signed by Marni Barriga RN on 7/8/25 at 11:04 PM EDT    **SHARE this note so that the co-signing nurse can place an eSignature**    Nurse 2 eSignature: Electronically signed by Randal Martinez RN on 7/8/25 at 11:04 PM EDT    
EEG completed and available for interpretation on the [facility] database .    
MRI screening form completed.  
NAME:  Jo Mcgee  YOB: 1975  MEDICAL RECORD NUMBER:  [id number]    Shift Summary: Pt downgraded to medsurge. Tele-psych and psychiatry consulted. Lexapro added. MRI and EEG ordered (to be done tomorrow). Electrolytes continuing to be replaced.    Family updated: Yes:  pt's  and parents at bedside today    Rhythm: Normal Sinus Rhythm     Most recent vitals:   Visit Vitals  /78   Pulse 88   Temp 98 °F (36.7 °C) (Oral)   Resp 18   Ht 1.575 m (5' 2\")   Wt 54.9 kg (121 lb 0.5 oz)   SpO2 97%   BMI 22.14 kg/m²           No data found.    No data found.      Respiratory support needed (if any):  - RA    Admission weight Weight - Scale: 58.8 kg (129 lb 10.1 oz) (07/08/25 1611)    Today's weight    Wt Readings from Last 1 Encounters:   07/09/25 54.9 kg (121 lb 0.5 oz)        Lee need assessed each shift: N/A - no lee present  UOP >30ml/hr: YES  Last documented BM (in last 48 hrs):  Patient Vitals for the past 48 hrs:   Last BM (including prior to admit) Stool Occurrence   07/09/25 0412 -- 1   07/09/25 1510 07/09/25 1                Restraints (in use currently or dc'd in last 12 hrs): No    Order current and documentation up to date? No    Lines/Drains reviewed @ bedside.  Peripheral IV 07/08/25 Left Antecubital (Active)   Number of days: 1         Drip rates at handoff:    sodium chloride         Lab Data:   CBC:   Recent Labs     07/08/25  1628 07/09/25  0512   WBC 5.2 4.3   HGB 12.9 12.1   HCT 36.5 33.9*   MCV 99.3 98.6   PLT 65* 56*     BMP:    Recent Labs     07/09/25  0512 07/09/25  1436    136   K 3.4* 3.0*   CO2 25 22   BUN 6* 4*   CREATININE 0.5* 0.4*     ABG: No results for input(s): \"PHART\", \"THG0BLS\", \"PO2ART\" in the last 72 hours.    Any consults during the shift? Yes: Consults received: : tele-psych and psychiatry    Any signed and held orders to be released?  No        4 Eyes Skin Assessment       The patient is being assessed for  Shift Handoff    I agree that at 
NAME:  Jo Mcgee  YOB: 1975  MEDICAL RECORD NUMBER:  [id number]    Shift Summary: Admitted for electrolyte imbalance and withdrawal seizure. Lytes replaced per protocol, VSS, Aox4.     Family updated: Yes:   bedside    Rhythm: Normal Sinus Rhythm     Most recent vitals:   Visit Vitals  BP (!) 130/91   Pulse 85   Temp 98.6 °F (37 °C) (Oral)   Resp 16   Ht 1.575 m (5' 2\")   Wt 54.9 kg (121 lb 0.5 oz)   SpO2 98%   BMI 22.14 kg/m²           No data found.    No data found.      Respiratory support needed (if any):  - RA    Admission weight Weight - Scale: 58.8 kg (129 lb 10.1 oz) (07/08/25 1611)    Today's weight    Wt Readings from Last 1 Encounters:   07/08/25 54.9 kg (121 lb 0.5 oz)        Lee need assessed each shift: N/A - no lee present  UOP >30ml/hr: YES  Last documented BM (in last 48 hrs):  Patient Vitals for the past 48 hrs:   Stool Occurrence   07/09/25 0412 1                Restraints (in use currently or dc'd in last 12 hrs): No    Order current and documentation up to date? No    Lines/Drains reviewed @ bedside.  Peripheral IV 07/08/25 Left Antecubital (Active)   Number of days: 0         Drip rates at handoff:    0.9% NaCl with KCl 20 mEq 100 mL/hr at 07/09/25 0437    sodium chloride      sodium chloride         Lab Data:   CBC:   Recent Labs     07/08/25  1628   WBC 5.2   HGB 12.9   HCT 36.5   MCV 99.3   PLT 65*     BMP:    Recent Labs     07/08/25  1628 07/08/25  2351   * 135*   K 2.9* 2.5*   CO2 23 26   BUN 10 9   CREATININE 0.6 0.6     ABG: No results for input(s): \"PHART\", \"RDR3MYR\", \"PO2ART\" in the last 72 hours.    Any consults during the shift? No    Any signed and held orders to be released?  No        4 Eyes Skin Assessment       The patient is being assessed for  Shift Handoff    I agree that at least one RN has performed a thorough Head to Toe Skin Assessment on the patient. ALL assessment sites listed below have been assessed.      Areas assessed by 
Patient left for discharge via wheelchair, and transported by family. Discharge paperwork given to patient, and she verbalized that she understood the discharge instructions. Patient voiced no concerns at this time, and no acute distress noted from the patient at this time.  
Patient took tele monitor off, states she is going home today. CMU notified. Dr Bains made aware.  
Pt from ICU 2116 to 4251. Pt A&Ox4. VSS. Pt oriented to room. Bed rails padded per seizure precautions. All questions answered at this time. All safety precautions in place.     Electronically signed by Vannessa Catherine RN on 7/9/2025 at 6:20 PM     
Pt's potassium resulted 3.0. Message sent to Dr Bains. Order received to give 40 PO potassium and 40 IV.  
Report called to Vannesas FOSTER on 4N. All questions answered. Pt to be transferred to room 4251 once clean. Pt updated on plan of care. Pt to update her family on moving rooms.  
Spoke with MRI tech who stated that pt's MRI will not be able to get done today and will have to be scheduled for sometime tomorrow.   
cause or life stress as evidenced by patient reported barriers    Nutrition Interventions:   Food and/or Nutrient Delivery: Continue Current Diet  Nutrition Education/Counseling: Education/Counseling not indicated  Coordination of Nutrition Care: Continue to monitor while inpatient       Goals:  Goals: PO intake 75% or greater     Previous Goal Met: New Goal    Nutrition Monitoring and Evaluation:   Behavioral-Environmental Outcomes: None Identified  Food/Nutrient Intake Outcomes: Food and Nutrient Intake, Vitamin/Mineral Intake  Physical Signs/Symptoms Outcomes: Biochemical Data, GI Status, Meal Time Behavior    Discharge Planning:    Too soon to determine     Mena Maynard RD  Contact: [id number]

## 2025-07-10 NOTE — DISCHARGE SUMMARY
V2.0  Discharge Summary    Name:  Jo Mcgee /Age/Sex: 1975 (49 y.o. female)   Admit Date: 2025  Discharge Date: 7/10/25    MRN & CSN:  [id number] & [id number] Encounter Date and Time 7/10/25 6:11 PM EDT    Attending:  Nicholas Bains MD Discharging Provider: Nicholas Bains MD       Hospital Course:     Brief HPI: Jo Mcgee is a  49 y.o. female with pmh of alcohol dependency, history of  withdrawal seizures seizure    Patient stated today she was at dental office for the procedure however she had a seizure was sent for further evaluation    Brief Problem Based Course:     -Seizure, suspected precipitated by alcohol drawal and severe electrolyte derangement     3rd seizure in her lifetime, previous episodes attributed to alcohol drug..  MRI EEG  with normal  Neurology consulted, MRI/EEG negative   seizure precautions emphasized including driving restrictions for 3 months     Neurology contemplated starting AED but patient decided to wait..  Close outpatient follow with neurology recommended  -Alcohol use disorder--patient minimizes her alcohol intake     No signs of withdrawal observed, complete cessation recommended  -Severe hypokalemia-repleted  -Severe hypophosphatemia-replete  -Severe hypomagnesemia-repleted  -Hyponatremia, mild-resolved with fluids  -Lactic acidosis due to seizure-resolved  -Alcoholic liver disease-patient with transaminitis, CT abdomen in  showed fatty liver, hepatitis panel was negative      Outpatient referral to GI provided and close follow-up with PCP recommended.  Complete alcohol abstinence recommended  -Chronic thrombocytopenia likely related to alcohol use-  Close follow-up with PCP recommended  -Anxiety and depression-psychiatry consulted admission    The patient expressed appropriate understanding of, and agreement with the discharge recommendations, medications, and plan.     Consults this admission:  IP CONSULT TO SOCIAL WORK  IP CONSULT

## 2025-07-10 NOTE — PLAN OF CARE
Problem: Metabolic/Fluid and Electrolytes - Adult  Goal: Electrolytes maintained within normal limits  Outcome: Progressing     Problem: Safety - Adult  Goal: Free from fall injury  7/9/2025 2206 by Ann Shafer RN  Outcome: Progressing  7/9/2025 1106 by Susie Jacobs RN  Outcome: Progressing  Flowsheets (Taken 7/9/2025 1106)  Free From Fall Injury: Instruct family/caregiver on patient safety     Problem: Seizure Precautions  Goal: Remains free of injury related to seizures activity  7/9/2025 2206 by Ann Shafer RN  Outcome: Progressing  7/9/2025 1106 by Susie Jacobs RN  Outcome: Progressing  Flowsheets (Taken 7/9/2025 1106)  Remains free of injury related to seizure activity:   Maintain airway, patient safety  and administer oxygen as ordered   Monitor patient for seizure activity, document and report duration and description of seizure to Licensed Independent Practitioner   If seizure occurs, turn patient to side and suction secretions as needed   Reorient patient post seizure

## 2025-07-10 NOTE — PLAN OF CARE
Problem: Discharge Planning  Goal: Discharge to home or other facility with appropriate resources  Outcome: Progressing     Problem: Seizure Precautions  Goal: Remains free of injury related to seizures activity  7/10/2025 0742 by Isabel Mora RN  Outcome: Progressing  7/9/2025 2206 by Ann Shafer RN  Outcome: Progressing     Problem: Safety - Adult  Goal: Free from fall injury  7/10/2025 0742 by Isabel Mora RN  Outcome: Progressing  7/9/2025 2206 by Ann Shafer RN  Outcome: Progressing     Problem: Metabolic/Fluid and Electrolytes - Adult  Goal: Electrolytes maintained within normal limits  7/10/2025 0742 by Isabel Mora RN  Outcome: Progressing  7/9/2025 2206 by Ann Shafer RN  Outcome: Progressing     Problem: Hematologic - Adult  Goal: Maintains hematologic stability  Outcome: Progressing     Problem: Neurosensory - Adult  Goal: Achieves stable or improved neurological status  Outcome: Progressing     Problem: Anxiety  Goal: Will report anxiety at manageable levels  Description: INTERVENTIONS:  1. Administer medication as ordered  2. Teach and rehearse alternative coping skills  3. Provide emotional support with 1:1 interaction with staff  Outcome: Progressing     Problem: Coping  Goal: Pt/Family able to verbalize concerns and demonstrate effective coping strategies  Description: INTERVENTIONS:  1. Assist patient/family to identify coping skills, available support systems and cultural and spiritual values  2. Provide emotional support, including active listening and acknowledgement of concerns of patient and caregivers  3. Reduce environmental stimuli, as able  4. Instruct patient/family in relaxation techniques, as appropriate  5. Assess for spiritual pain/suffering and initiate Spiritual Care, Psychosocial Clinical Specialist consults as needed  Outcome: Progressing     Problem: Pain  Goal: Verbalizes/displays adequate comfort level or baseline comfort level  Outcome:

## 2025-07-10 NOTE — CARE COORDINATION
Case Management Assessment  Initial Evaluation    Date/Time of Evaluation: 7/10/2025 12:36 PM  Assessment Completed by: IESHA Jarrell    If patient is discharged prior to next notation, then this note serves as note for discharge by case management.    Patient Name: Jo Mcgee                   YOB: 1975  Diagnosis: Hypokalemia [E87.6]  Lactic acidosis [E87.20]  Metabolic alkalosis [E87.3]  Hypomagnesemia [E83.42]  Hypophosphatemia [E83.39]  Thrombocytopenia [D69.6]  Elevated liver enzymes [R74.8]  Alcohol withdrawal seizure with complication (HCC) [F10.939, R56.9]                   Date / Time: 7/8/2025  4:05 PM    Patient Admission Status: Inpatient   Readmission Risk (Low < 19, Mod (19-27), High > 27): Readmission Risk Score: 7.9    Current PCP: Denise Pham PA-C  PCP verified by CM? (P) Yes    Chart Reviewed: Yes      History Provided by: (P) Patient  Patient Orientation: (P) Alert and Oriented, Person, Place    Patient Cognition:      Hospitalization in the last 30 days (Readmission):  No    If yes, Readmission Assessment in  Navigator will be completed.    Advance Directives:      Code Status: Full Code   Patient's Primary Decision Maker is: (P) Legal Next of Kin      Discharge Planning:    Patient lives with: (P) Spouse/Significant Other Type of Home: (P) House  Primary Care Giver: (P) Self  Patient Support Systems include: (P) Family Members   Current Financial resources: (P) None  Current community resources: (P) None  Current services prior to admission: (P) None            Current DME:              Type of Home Care services:  (P) None    ADLS  Prior functional level: (P) Independent in ADLs/IADLs  Current functional level: (P) Independent in ADLs/IADLs    PT AM-PAC:   /24  OT AM-PAC:   /24    Family can provide assistance at DC: (P) Yes  Would you like Case Management to discuss the discharge plan with any other family members/significant others, and if so, who? (P)

## 2025-07-10 NOTE — PROCEDURES
PROCEDURE NOTE  Date: 7/10/2025   Name: Jo Mcgee  YOB: 1975    Name: Jo Mcgee   : 1975   Interpreting Physician: Venita Hanna MD   Referring Physician: KIYA Kim  Date of EE/10/2025      Clinical History:History of seizures    Current Antiepileptic Medications: Current Facility-Administered Medications: escitalopram (LEXAPRO) tablet 5 mg, 5 mg, Oral, Daily  traZODone (DESYREL) tablet 50 mg, 50 mg, Oral, Nightly PRN  sodium phosphate 9.42 mmol in sodium chloride 0.9 % 250 mL IVPB, 0.16 mmol/kg, IntraVENous, PRN **OR** sodium phosphate 18.81 mmol in sodium chloride 0.9 % 250 mL IVPB, 0.32 mmol/kg, IntraVENous, PRN  sodium chloride flush 0.9 % injection 5-40 mL, 5-40 mL, IntraVENous, 2 times per day  sodium chloride flush 0.9 % injection 5-40 mL, 5-40 mL, IntraVENous, PRN  0.9 % sodium chloride infusion, , IntraVENous, PRN  thiamine mononitrate tablet 100 mg, 100 mg, Oral, Daily  potassium chloride (KLOR-CON M) extended release tablet 40 mEq, 40 mEq, Oral, PRN **OR** potassium bicarb-citric acid (EFFER-K) effervescent tablet 40 mEq, 40 mEq, Oral, PRN **OR** potassium chloride 10 mEq/100 mL IVPB (Peripheral Line), 10 mEq, IntraVENous, PRN  magnesium sulfate 2000 mg in 50 mL IVPB premix, 2,000 mg, IntraVENous, PRN  enoxaparin (LOVENOX) injection 40 mg, 40 mg, SubCUTAneous, Daily  ondansetron (ZOFRAN-ODT) disintegrating tablet 4 mg, 4 mg, Oral, Q8H PRN **OR** ondansetron (ZOFRAN) injection 4 mg, 4 mg, IntraVENous, Q6H PRN  polyethylene glycol (GLYCOLAX) packet 17 g, 17 g, Oral, Daily PRN  acetaminophen (TYLENOL) tablet 650 mg, 650 mg, Oral, Q6H PRN **OR** acetaminophen (TYLENOL) suppository 650 mg, 650 mg, Rectal, Q6H PRN  folic acid (FOLVITE) tablet 1 mg, 1 mg, Oral, Daily  [COMPLETED] PHENobarbital tablet 64.8 mg, 64.8 mg, Oral, 4x daily **FOLLOWED BY** PHENobarbital tablet 32.4 mg, 32.4 mg, Oral, 4x daily **FOLLOWED BY** PHENobarbital tablet 32.4 mg, 32.4 mg,

## 2025-07-11 ENCOUNTER — CARE COORDINATION (OUTPATIENT)
Dept: CASE MANAGEMENT | Age: 50
End: 2025-07-11

## 2025-07-11 NOTE — CARE COORDINATION
Care Transitions Note    Initial Call - Call within 2 business days of discharge: Yes    Attempted to reach patient for transitions of care follow up. Unable to reach patient.    Outreach Attempts:   HIPAA compliant voicemail left for patient.     Patient: Jo Mcgee    Patient : 1975   MRN: <[id number]>    Reason for Admission: ETOH withdrawal   Discharge Date: 7/10/25  RURS: Readmission Risk Score: 9.7    Last Discharge Facility       Date Complaint Diagnosis Description Type Department Provider    25 Seizures Alcohol withdrawal seizure with complication (HCC) ... ED to Hosp-Admission (Discharged) (ADMITTED) [facility] ManjitN Nicholas Bains MD; Gerda...            Was this an external facility discharge? No    Follow Up Appointment:   Patient does not have a follow up appointment scheduled at time of call. UTR      Plan for follow-up on next business day.      MAYLIN ANGUIANO RN

## 2025-07-12 LAB — BACTERIA BLD CULT: NORMAL

## 2025-07-13 LAB — BACTERIA BLD CULT ORG #2: NORMAL

## 2025-07-14 ENCOUNTER — CARE COORDINATION (OUTPATIENT)
Dept: CASE MANAGEMENT | Age: 50
End: 2025-07-14

## 2025-07-14 NOTE — CARE COORDINATION
Care Transitions Note    Initial Call - Call within 2 business days of discharge: Yes    Attempted to reach patient for transitions of care follow up. Unable to reach patient.    Outreach Attempts:   Multiple attempts to contact patient at phone numbers on file.   HIPAA compliant voicemail left for patient.     Patient: Jo Mcgee    Patient : 1975   MRN: <[id number]>    Reason for Admission: ETOH  Discharge Date: 7/10/25  RURS: Readmission Risk Score: 9.7    Last Discharge Facility       Date Complaint Diagnosis Description Type Department Provider    25 Seizures Alcohol withdrawal seizure with complication (HCC) ... ED to Hosp-Admission (Discharged) (ADMITTED) [facility]OT[facility] Bains, Nicholas Torres MD; Gerda...            Was this an external facility discharge? No    Follow Up Appointment:   Patient does not have a follow up appointment scheduled at time of call. UTR      No further follow-up call indicated     MAYLIN ANGUIANO RN